# Patient Record
Sex: FEMALE | Race: WHITE | NOT HISPANIC OR LATINO | Employment: FULL TIME | URBAN - METROPOLITAN AREA
[De-identification: names, ages, dates, MRNs, and addresses within clinical notes are randomized per-mention and may not be internally consistent; named-entity substitution may affect disease eponyms.]

---

## 2019-06-17 ENCOUNTER — TELEPHONE (OUTPATIENT)
Dept: PSYCHIATRY | Facility: CLINIC | Age: 34
End: 2019-06-17

## 2019-07-15 ENCOUNTER — TELEPHONE (OUTPATIENT)
Dept: PSYCHIATRY | Facility: CLINIC | Age: 34
End: 2019-07-15

## 2019-07-15 DIAGNOSIS — F90.0 ADHD (ATTENTION DEFICIT HYPERACTIVITY DISORDER), INATTENTIVE TYPE: Primary | ICD-10-CM

## 2019-07-31 ENCOUNTER — OFFICE VISIT (OUTPATIENT)
Dept: PSYCHIATRY | Facility: CLINIC | Age: 34
End: 2019-07-31

## 2019-07-31 DIAGNOSIS — F90.2 ATTENTION DEFICIT HYPERACTIVITY DISORDER (ADHD), COMBINED TYPE: Primary | ICD-10-CM

## 2019-07-31 DIAGNOSIS — F90.0 ADHD (ATTENTION DEFICIT HYPERACTIVITY DISORDER), INATTENTIVE TYPE: ICD-10-CM

## 2019-07-31 PROCEDURE — 99213 OFFICE O/P EST LOW 20 MIN: CPT | Performed by: PSYCHIATRY & NEUROLOGY

## 2019-07-31 NOTE — PROGRESS NOTES
Subjective   Annalisa Fuentes is a 33 y.o. female who presents today for follow up    Chief Complaint:  Depression anxiety decreased concentration     History of Present Illness:   Concentration is better on meds   the pt was doing better on vyvanse chewable, 1/2 BID, she is working long shifts , able to stay productive to finish her work   Her job is pretty unpredictable, no opportunity to make adjustment   denied feeling hopeless/helpless, denied AVH/SI/Hi  anxiety is getting more intense when off meds   concentration - good on meds     Precipitating and Ameliorating Factors: dog passed away         PAST PSYCHIATRIC HISTORY      Previous Psychiatric Diagnoses   Axis I: Attention Deficit Disorder     Past Hospitalizations or Residential Treatment   Locations\Providers: none      Past Outpatient Treatment   Diagnosis Treated: ADD  Location: PCP      Prior Psychiatric Medications   Comments: adderall - skin picking       Consequences of Mental Disorder   Consequences: emotional distress     SOCIAL HISTORY     Number of marriages: 1  Number of children: 0  Current Relationship is: supportive  Family of Origin is: abusive     Current Living Situation   Lives with: spouse     Education   Level: some college     Employment   Job Status: full-time     Hobbies and Leisure Activities   Activity Type: quiet activities     Alcohol use   Freq. drinkin drink  Smoking History   Smoking Hx: Never smoker     Exercise   Exercise sessions (per wk): 1     Illicit Drug Use   Illicit Drugs used: none     FAMILY HISTORY OF MENTAL DISORDERS   fh Grandparents: Non-contributory  fh Mother: Non-contributory  fh Father: Addictive Disorders - Alcoholism  fh Siblings: Non-contributory  fh Other: Non-contributory     The following portions of the patient's history were reviewed and updated as appropriate: allergies, current medications, past family history, past medical history, past social history, past surgical history and problem  list.            Interval History  No Change    Side Effects  Denied       Past Medical History:  Past Medical History:   Diagnosis Date   • ADHD (attention deficit hyperactivity disorder)        Social History:  Social History     Socioeconomic History   • Marital status:      Spouse name: Not on file   • Number of children: Not on file   • Years of education: Not on file   • Highest education level: Not on file   Tobacco Use   • Smoking status: Never Smoker   Substance and Sexual Activity   • Alcohol use: Yes     Comment: socially   • Drug use: No       Family History:  History reviewed. No pertinent family history.    Past Surgical History:  History reviewed. No pertinent surgical history.    Problem List:  Patient Active Problem List   Diagnosis   • Attention deficit hyperactivity disorder (ADHD), combined type       Allergy:   Allergies   Allergen Reactions   • Amoxicillin Nausea And Vomiting        Discontinued Medications:  Medications Discontinued During This Encounter   Medication Reason   • Lisdexamfetamine Dimesylate (VYVANSE) 60 MG chewable tablet Reorder       Current Medications:   Current Outpatient Medications   Medication Sig Dispense Refill   • Lisdexamfetamine Dimesylate (VYVANSE) 60 MG chewable tablet Chew 60 mg Every Morning 30 tablet 0     No current facility-administered medications for this visit.          Review of Symptoms:    Psychiatric/Behavioral: Negative for agitation, behavioral problems, confusion, decreased concentration, dysphoric mood, hallucinations, self-injury, sleep disturbance and suicidal ideas. The patient is not nervous/anxious and is not hyperactive.        Physical Exam:   There were no vitals taken for this visit.    Mental Status Exam:   Hygiene:   good  Cooperation:  Cooperative  Eye Contact:  Good  Psychomotor Behavior:  Appropriate  Affect:  Full range  Mood: anxious  Hopelessness: Denies  Speech:  Normal  Thought Process:  Goal directed and Linear  Thought  Content:  Normal  Suicidal:  None  Homicidal:  None  Hallucinations:  None  Delusion:  None  Memory:  Intact  Orientation:  Person, Place, Time and Situation  Reliability:  good  Insight:  Good  Judgement:  Good  Impulse Control:  Good  Physical/Medical Issues:  No        PHQ-9 Depression Screening  Little interest or pleasure in doing things? 1   Feeling down, depressed, or hopeless? 1   Trouble falling or staying asleep, or sleeping too much? 0   Feeling tired or having little energy? 1   Poor appetite or overeating? 0   Feeling bad about yourself - or that you are a failure or have let yourself or your family down? 1   Trouble concentrating on things, such as reading the newspaper or watching television? 2   Moving or speaking so slowly that other people could have noticed? Or the opposite - being so fidgety or restless that you have been moving around a lot more than usual? 0   Thoughts that you would be better off dead, or of hurting yourself in some way? 0   PHQ-9 Total Score 6   If you checked off any problems, how difficult have these problems made it for you to do your work, take care of things at home, or get along with other people? Very difficult           Never smoker    I advised Annalisa of the risks of tobacco use.     Lab Results:   No visits with results within 3 Month(s) from this visit.   Latest known visit with results is:   No results found for any previous visit.       Assessment/Plan   Problems Addressed this Visit        Other    Attention deficit hyperactivity disorder (ADHD), combined type - Primary    Relevant Medications    Lisdexamfetamine Dimesylate (VYVANSE) 60 MG chewable tablet      Other Visit Diagnoses     ADHD (attention deficit hyperactivity disorder), inattentive type        Relevant Medications    Lisdexamfetamine Dimesylate (VYVANSE) 60 MG chewable tablet          Visit Diagnoses:    ICD-10-CM ICD-9-CM   1. Attention deficit hyperactivity disorder (ADHD), combined type F90.2  314.01   2. ADHD (attention deficit hyperactivity disorder), inattentive type F90.0 314.00       TREATMENT PLAN/GOALS: Continue supportive psychotherapy efforts and medications as indicated. Treatment and medication options discussed during today's visit. Patient ackowledged and verbally consented to continue with current treatment plan and was educated on the importance of compliance with treatment and follow-up appointments.    MEDICATION ISSUES:  Cont vyvanse 60 mg   Issues with work organization discussed   long term use discussed   INSPECT reviewed as expected  Discussed medication options and treatment plan of prescribed medication as well as the risks, benefits, and side effects including potential falls, possible impaired driving and metabolic adversities among others. Patient is agreeable to call the office with any worsening of symptoms or onset of side effects. Patient is agreeable to call 911 or go to the nearest ER should he/she begin having SI/HI. No medication side effects or related complaints today.     MEDS ORDERED DURING VISIT:  New Medications Ordered This Visit   Medications   • Lisdexamfetamine Dimesylate (VYVANSE) 60 MG chewable tablet     Sig: Chew 60 mg Every Morning     Dispense:  30 tablet     Refill:  0       Return in about 3 months (around 10/31/2019).         This document has been electronically signed by Patt Perry MD  July 31, 2019 1:42 PM

## 2019-07-31 NOTE — PATIENT INSTRUCTIONS
Attention Deficit Hyperactivity Disorder, Adult  Attention deficit hyperactivity disorder (ADHD) is a mental health disorder that starts during childhood. For many people with ADHD, the disorder continues into adult years. There are many things that you and your health care provider or therapist (mental health professional) can do to manage your symptoms.  What are the causes?  The exact cause of ADHD is not known.  What increases the risk?  You are more likely to develop this condition if:  · You have a family history of ADHD.  · You are male.  · You were born to a mother who smoked or drank alcohol during pregnancy.  · You were exposed to lead poisoning or other toxins in the womb or in early life.  · You were born before 37 weeks of pregnancy (prematurely) or you had a low birth weight.  · You have experienced a brain injury.  What are the signs or symptoms?  Symptoms of this condition depend on the type of ADHD. The two main types are inattentive and hyperactive-impulsive. Some people may have symptoms of both types.  Symptoms of the inattentive type include:  · Difficulty watching, listening, or thinking with focused effort (paying attention).  · Making careless mistakes.  · Not listening.  · Not following instructions.  · Being disorganized.  · Avoiding tasks that require time and attention.  · Losing things.  · Forgetting things.  · Being easily distracted.  Symptoms of the hyperactive-impulsive type include:  · Restlessness.  · Talking too much.  · Interrupting.  · Difficulty with:  ? Sitting still.  ? Staying quiet.  ? Feeling motivated.  ? Relaxing.  ? Waiting in line or waiting for a turn.  How is this diagnosed?  This condition is diagnosed based on your current symptoms and your history of symptoms. The diagnosis can be made by a provider such as a primary care provider, psychiatrist, psychologist, or clinical . The provider may use a symptom checklist or a standardized behavior rating  scale to evaluate your symptoms. He or she may want to talk with family members who have known you for a long time and have observed your behaviors.  There are no lab tests or brain imaging tests that can diagnose ADHD.  How is this treated?  This condition can be treated with medicines and behavior therapy. Medicines may be the best option to reduce impulsive behaviors and improve attention. Your health care provider may recommend:  · Stimulant medicines. These are the most common medicines used for adult ADHD. They affect certain chemicals in the brain (neurotransmitters). These medicines may be long-acting or short-acting. This will determine how often you need to take the medicine.  · A non-stimulant medicine for adult ADHD (atomoxetine). This medicine increases a neurotransmitter called norepinephrine. It may take weeks to months to see effects from this medicine.  Psychotherapy and behavioral management are also important for treating ADHD. Psychotherapy is often used along with medicine. Your health care provider may suggest:  · Cognitive behavioral therapy (CBT). This type of therapy teaches you to replace negative thoughts and actions with positive thoughts and actions. When used as part of ADHD treatment, this therapy may also include:  ? Coping strategies for organization, time management, impulse control, and stress reduction.  ? Mindfulness and meditation training.  · Behavioral management. This may include strategies for organization and time management. You may work with an ADHD  who is specially trained to help people with ADHD to manage and organize activities and to function more effectively.  Follow these instructions at home:  Medicines    · Take over-the-counter and prescription medicines only as told by your health care provider.  · Talk with your health care provider about the possible side effects of your medicine to watch for.  General instructions    · Learn as much as you can about  adult ADHD, and work closely with your health care providers to find the treatments that work best for you.  · Do not use drugs or abuse alcohol. Limit alcohol intake to no more than 1 drink a day for nonpregnant women and 2 drinks a day for men. One drink equals 12 oz of beer, 5 oz of wine, or 1½ oz of hard liquor.  · Follow the same schedule each day. Make sure your schedule includes enough time for you to get plenty of sleep.  · Use reminder devices like notes, calendars, and phone apps to stay on-time and organized.  · Eat a healthy diet. Do not skip meals.  · Exercise regularly. Exercise can help to reduce stress and anxiety.  · Keep all follow-up visits as told by your health care provider and therapist. This is important.  Where to find more information  · A health care provider may be able to recommend resources that are available online or over the phone. You could start with:  ? Attention Deficit Disorder Association (ADDA): www.add.org  ? [place] (NIMH): www.nimh.nih.gov  Contact a health care provider if:  · Your symptoms are changing, getting worse, or not improving.  · You have side effects from your medicine, such as:  ? Repeated muscle twitches, coughing, or speech outbursts.  ? Sleep problems.  ? Loss of appetite.  ? Depression.  ? New or worsening behavior problems.  ? Dizziness.  ? Unusually fast heartbeat.  ? Stomach pains.  ? Headaches.  · You are struggling with anxiety, depression, or substance abuse.  Get help right away if:  · You have a severe reaction to a medicine.  · You have thoughts of hurting yourself or others.  If you ever feel like you may hurt yourself or others, or have thoughts about taking your own life, get help right away. You can go to the nearest emergency department or call:  · Your local emergency services (911 in the [place]  · A suicide crisis helpline, such as the National Suicide Prevention Lifeline at 1-130.688.3394. This is open 24 hours a  day.  Summary  · ADHD is a mental health disorder that starts during childhood and often continues into adult years.  · The exact cause of ADHD is not known.  · There is no cure for ADHD, but treatment with medicine, therapy, or behavioral training can help you manage your condition.  This information is not intended to replace advice given to you by your health care provider. Make sure you discuss any questions you have with your health care provider.  Document Released: 08/09/2018 Document Revised: 08/09/2018 Document Reviewed: 08/09/2018  [employer] Interactive Patient Education © 2019 [employer]

## 2019-08-08 DIAGNOSIS — F90.0 ADHD (ATTENTION DEFICIT HYPERACTIVITY DISORDER), INATTENTIVE TYPE: ICD-10-CM

## 2019-09-09 DIAGNOSIS — F90.0 ADHD (ATTENTION DEFICIT HYPERACTIVITY DISORDER), INATTENTIVE TYPE: ICD-10-CM

## 2019-10-09 DIAGNOSIS — F90.0 ADHD (ATTENTION DEFICIT HYPERACTIVITY DISORDER), INATTENTIVE TYPE: ICD-10-CM

## 2019-11-06 ENCOUNTER — OFFICE VISIT (OUTPATIENT)
Dept: PSYCHIATRY | Facility: CLINIC | Age: 34
End: 2019-11-06

## 2019-11-06 ENCOUNTER — DOCUMENTATION (OUTPATIENT)
Dept: PSYCHIATRY | Facility: CLINIC | Age: 34
End: 2019-11-06

## 2019-11-06 DIAGNOSIS — F90.0 ATTENTION DEFICIT HYPERACTIVITY DISORDER (ADHD), PREDOMINANTLY INATTENTIVE TYPE: Primary | ICD-10-CM

## 2019-11-06 DIAGNOSIS — Z79.899 ENCOUNTER FOR LONG-TERM (CURRENT) USE OF OTHER MEDICATIONS: ICD-10-CM

## 2019-11-06 DIAGNOSIS — F90.0 ADHD (ATTENTION DEFICIT HYPERACTIVITY DISORDER), INATTENTIVE TYPE: ICD-10-CM

## 2019-11-06 PROBLEM — F90.2 ATTENTION DEFICIT HYPERACTIVITY DISORDER (ADHD), COMBINED TYPE: Status: RESOLVED | Noted: 2018-12-19 | Resolved: 2019-11-06

## 2019-11-06 PROCEDURE — 99213 OFFICE O/P EST LOW 20 MIN: CPT | Performed by: PSYCHIATRY & NEUROLOGY

## 2019-11-06 NOTE — PATIENT INSTRUCTIONS
Attention Deficit Hyperactivity Disorder, Adult  Attention deficit hyperactivity disorder (ADHD) is a mental health disorder that starts during childhood. For many people with ADHD, the disorder continues into adult years. There are many things that you and your health care provider or therapist (mental health professional) can do to manage your symptoms.  What are the causes?  The exact cause of ADHD is not known.  What increases the risk?  You are more likely to develop this condition if:  · You have a family history of ADHD.  · You are male.  · You were born to a mother who smoked or drank alcohol during pregnancy.  · You were exposed to lead poisoning or other toxins in the womb or in early life.  · You were born before 37 weeks of pregnancy (prematurely) or you had a low birth weight.  · You have experienced a brain injury.  What are the signs or symptoms?  Symptoms of this condition depend on the type of ADHD. The two main types are inattentive and hyperactive-impulsive. Some people may have symptoms of both types.  Symptoms of the inattentive type include:  · Difficulty watching, listening, or thinking with focused effort (paying attention).  · Making careless mistakes.  · Not listening.  · Not following instructions.  · Being disorganized.  · Avoiding tasks that require time and attention.  · Losing things.  · Forgetting things.  · Being easily distracted.  Symptoms of the hyperactive-impulsive type include:  · Restlessness.  · Talking too much.  · Interrupting.  · Difficulty with:  ? Sitting still.  ? Staying quiet.  ? Feeling motivated.  ? Relaxing.  ? Waiting in line or waiting for a turn.  How is this diagnosed?  This condition is diagnosed based on your current symptoms and your history of symptoms. The diagnosis can be made by a provider such as a primary care provider, psychiatrist, psychologist, or clinical . The provider may use a symptom checklist or a standardized behavior rating  scale to evaluate your symptoms. He or she may want to talk with family members who have known you for a long time and have observed your behaviors.  There are no lab tests or brain imaging tests that can diagnose ADHD.  How is this treated?  This condition can be treated with medicines and behavior therapy. Medicines may be the best option to reduce impulsive behaviors and improve attention. Your health care provider may recommend:  · Stimulant medicines. These are the most common medicines used for adult ADHD. They affect certain chemicals in the brain (neurotransmitters). These medicines may be long-acting or short-acting. This will determine how often you need to take the medicine.  · A non-stimulant medicine for adult ADHD (atomoxetine). This medicine increases a neurotransmitter called norepinephrine. It may take weeks to months to see effects from this medicine.  Psychotherapy and behavioral management are also important for treating ADHD. Psychotherapy is often used along with medicine. Your health care provider may suggest:  · Cognitive behavioral therapy (CBT). This type of therapy teaches you to replace negative thoughts and actions with positive thoughts and actions. When used as part of ADHD treatment, this therapy may also include:  ? Coping strategies for organization, time management, impulse control, and stress reduction.  ? Mindfulness and meditation training.  · Behavioral management. This may include strategies for organization and time management. You may work with an ADHD  who is specially trained to help people with ADHD to manage and organize activities and to function more effectively.  Follow these instructions at home:  Medicines    · Take over-the-counter and prescription medicines only as told by your health care provider.  · Talk with your health care provider about the possible side effects of your medicine to watch for.  General instructions    · Learn as much as you can about  adult ADHD, and work closely with your health care providers to find the treatments that work best for you.  · Do not use drugs or abuse alcohol. Limit alcohol intake to no more than 1 drink a day for nonpregnant women and 2 drinks a day for men. One drink equals 12 oz of beer, 5 oz of wine, or 1½ oz of hard liquor.  · Follow the same schedule each day. Make sure your schedule includes enough time for you to get plenty of sleep.  · Use reminder devices like notes, calendars, and phone apps to stay on-time and organized.  · Eat a healthy diet. Do not skip meals.  · Exercise regularly. Exercise can help to reduce stress and anxiety.  · Keep all follow-up visits as told by your health care provider and therapist. This is important.  Where to find more information  · A health care provider may be able to recommend resources that are available online or over the phone. You could start with:  ? Attention Deficit Disorder Association (ADDA): www.add.org  ? National [place] of Mental Health (NIMH): www.nimh.nih.gov  Contact a health care provider if:  · Your symptoms are changing, getting worse, or not improving.  · You have side effects from your medicine, such as:  ? Repeated muscle twitches, coughing, or speech outbursts.  ? Sleep problems.  ? Loss of appetite.  ? Depression.  ? New or worsening behavior problems.  ? Dizziness.  ? Unusually fast heartbeat.  ? Stomach pains.  ? Headaches.  · You are struggling with anxiety, depression, or substance abuse.  Get help right away if:  · You have a severe reaction to a medicine.  · You have thoughts of hurting yourself or others.  If you ever feel like you may hurt yourself or others, or have thoughts about taking your own life, get help right away. You can go to the nearest emergency department or call:  · Your local emergency services (911 in the [place]  · A suicide crisis helpline, such as the National Suicide Prevention Lifeline at 1-301.823.1305. This is open 24 hours a  day.  Summary  · ADHD is a mental health disorder that starts during childhood and often continues into adult years.  · The exact cause of ADHD is not known.  · There is no cure for ADHD, but treatment with medicine, therapy, or behavioral training can help you manage your condition.  This information is not intended to replace advice given to you by your health care provider. Make sure you discuss any questions you have with your health care provider.  Document Released: 08/09/2018 Document Revised: 08/09/2018 Document Reviewed: 08/09/2018  [employer] Interactive Patient Education © 2019 [employer]

## 2019-11-06 NOTE — PROGRESS NOTES
Subjective   Annalisa Fuentes is a 33 y.o. female who presents today for follow up    Chief Complaint:  Depression anxiety decreased concentration     History of Present Illness:   Long history of ADHD, the pt is very busy at work  Toward the end of the year.   Concentration is better on meds , last long enough   the pt was doing better on vyvanse chewable, 1/2 BID, she is working long shifts , able to stay productive to finish her work, not missing deadlines    Her job is pretty unpredictable, no opportunity to make adjustment based on her schedule  The pt denied feeling depressed/hopeless/helpless,    Based on the nature of her job she has ot be accurate   anxiety is getting more intense when off meds   concentration - good on meds     Precipitating and Ameliorating Factors: dog passed away         PAST PSYCHIATRIC HISTORY      Previous Psychiatric Diagnoses   Axis I: Attention Deficit Disorder     Past Hospitalizations or Residential Treatment   Locations\Providers: none      Past Outpatient Treatment   Diagnosis Treated: ADD  Location: PCP      Prior Psychiatric Medications   Comments: adderall - skin picking       Consequences of Mental Disorder   Consequences: emotional distress     SOCIAL HISTORY     Number of marriages: 1  Number of children: 0  Current Relationship is: supportive  Family of Origin is: abusive     Current Living Situation   Lives with: spouse     Education   Level: some college     Employment   Job Status: full-time     Hobbies and Leisure Activities   Activity Type: quiet activities     Alcohol use   Freq. drinkin drink  Smoking History   Smoking Hx: Never smoker     Exercise   Exercise sessions (per wk): 1     Illicit Drug Use   Illicit Drugs used: none     FAMILY HISTORY OF MENTAL DISORDERS   fh Grandparents: Non-contributory  fh Mother: Non-contributory  fh Father: Addictive Disorders - Alcoholism  fh Siblings: Non-contributory  fh Other: Non-contributory     The following  portions of the patient's history were reviewed and updated as appropriate: allergies, current medications, past family history, past medical history, past social history, past surgical history and problem list.            Interval History  No Change    Side Effects  Denied       Past Medical History:  Past Medical History:   Diagnosis Date   • ADHD (attention deficit hyperactivity disorder)        Social History:  Social History     Socioeconomic History   • Marital status:      Spouse name: Not on file   • Number of children: Not on file   • Years of education: Not on file   • Highest education level: Not on file   Tobacco Use   • Smoking status: Never Smoker   • Smokeless tobacco: Never Used   Substance and Sexual Activity   • Alcohol use: Yes     Comment: socially   • Drug use: No   • Sexual activity: Defer       Family History:  History reviewed. No pertinent family history.    Past Surgical History:  History reviewed. No pertinent surgical history.    Problem List:  Patient Active Problem List   Diagnosis   • ADD (attention deficit disorder)   • Encounter for long-term (current) use of other medications       Allergy:   Allergies   Allergen Reactions   • Amoxicillin Nausea And Vomiting        Discontinued Medications:  Medications Discontinued During This Encounter   Medication Reason   • Lisdexamfetamine Dimesylate (VYVANSE) 60 MG chewable tablet Reorder       Current Medications:   Current Outpatient Medications   Medication Sig Dispense Refill   • Lisdexamfetamine Dimesylate (VYVANSE) 60 MG chewable tablet Chew 60 mg Every Morning 30 tablet 0     No current facility-administered medications for this visit.          Review of Symptoms:    Psychiatric/Behavioral: Negative for agitation, behavioral problems, confusion, decreased concentration, dysphoric mood, hallucinations, self-injury, sleep disturbance and suicidal ideas.   The patient is not nervous/anxious and is not hyperactive.        Physical  Exam:   There were no vitals taken for this visit.    Mental Status Exam:   Hygiene:   good  Cooperation:  Cooperative  Eye Contact:  Good  Psychomotor Behavior:  Appropriate  Affect:  Full range  Mood: fluctates  Hopelessness: Denies  Speech:  Normal  Thought Process:  Goal directed and Linear  Thought Content:  Normal  Suicidal:  None  Homicidal:  None  Hallucinations:  None  Delusion:  None  Memory:  Intact  Orientation:  Person, Place, Time and Situation  Reliability:  good  Insight:  Good  Judgement:  Good  Impulse Control:  Good  Physical/Medical Issues:  No      MSE was reviewed and accepted     PHQ-9 Depression Screening  Little interest or pleasure in doing things? 1   Feeling down, depressed, or hopeless? 1   Trouble falling or staying asleep, or sleeping too much? 0   Feeling tired or having little energy? 1   Poor appetite or overeating? 0   Feeling bad about yourself - or that you are a failure or have let yourself or your family down? 1   Trouble concentrating on things, such as reading the newspaper or watching television? 2   Moving or speaking so slowly that other people could have noticed? Or the opposite - being so fidgety or restless that you have been moving around a lot more than usual? 0   Thoughts that you would be better off dead, or of hurting yourself in some way? 0   PHQ-9 Total Score 6   If you checked off any problems, how difficult have these problems made it for you to do your work, take care of things at home, or get along with other people? Very difficult           Never smoker    I advised Annalisa of the risks of tobacco use.     Lab Results:   No visits with results within 3 Month(s) from this visit.   Latest known visit with results is:   No results found for any previous visit.       Assessment/Plan   Problems Addressed this Visit        Other    ADD (attention deficit disorder) - Primary    Relevant Medications    Lisdexamfetamine Dimesylate (VYVANSE) 60 MG chewable tablet     Encounter for long-term (current) use of other medications      Other Visit Diagnoses     ADHD (attention deficit hyperactivity disorder), inattentive type        Relevant Medications    Lisdexamfetamine Dimesylate (VYVANSE) 60 MG chewable tablet          Visit Diagnoses:    ICD-10-CM ICD-9-CM   1. Attention deficit hyperactivity disorder (ADHD), predominantly inattentive type F90.0 314.00   2. ADHD (attention deficit hyperactivity disorder), inattentive type F90.0 314.00   3. Encounter for long-term (current) use of other medications Z79.899 V58.69       TREATMENT PLAN/GOALS: Continue supportive psychotherapy efforts and medications as indicated. Treatment and medication options discussed during today's visit. Patient ackowledged and verbally consented to continue with current treatment plan and was educated on the importance of compliance with treatment and follow-up appointments.    MEDICATION ISSUES:  Cont vyvanse 60 mg   Issues with work organization discussed  UDS today    long term use discussed   INSPECT reviewed as expected - 10/9/19 - last refill   Discussed medication options and treatment plan of prescribed medication as well as the risks, benefits, and side effects including potential falls, possible impaired driving and metabolic adversities among others. Patient is agreeable to call the office with any worsening of symptoms or onset of side effects. Patient is agreeable to call 911 or go to the nearest ER should he/she begin having SI/HI. No medication side effects or related complaints today.     MEDS ORDERED DURING VISIT:  New Medications Ordered This Visit   Medications   • Lisdexamfetamine Dimesylate (VYVANSE) 60 MG chewable tablet     Sig: Chew 60 mg Every Morning     Dispense:  30 tablet     Refill:  0       Return in about 3 months (around 2/6/2020).         This document has been electronically signed by Patt Perry MD  November 6, 2019 2:04 PM

## 2019-12-03 DIAGNOSIS — F90.0 ADHD (ATTENTION DEFICIT HYPERACTIVITY DISORDER), INATTENTIVE TYPE: ICD-10-CM

## 2020-01-02 DIAGNOSIS — F90.0 ADHD (ATTENTION DEFICIT HYPERACTIVITY DISORDER), INATTENTIVE TYPE: ICD-10-CM

## 2020-01-30 ENCOUNTER — TELEPHONE (OUTPATIENT)
Dept: PSYCHIATRY | Facility: CLINIC | Age: 35
End: 2020-01-30

## 2020-01-30 DIAGNOSIS — F90.0 ADHD (ATTENTION DEFICIT HYPERACTIVITY DISORDER), INATTENTIVE TYPE: ICD-10-CM

## 2020-02-13 ENCOUNTER — OFFICE VISIT (OUTPATIENT)
Dept: PSYCHIATRY | Facility: CLINIC | Age: 35
End: 2020-02-13

## 2020-02-13 DIAGNOSIS — Z79.899 ENCOUNTER FOR LONG-TERM (CURRENT) USE OF OTHER MEDICATIONS: ICD-10-CM

## 2020-02-13 DIAGNOSIS — F90.0 ATTENTION DEFICIT HYPERACTIVITY DISORDER (ADHD), PREDOMINANTLY INATTENTIVE TYPE: Primary | ICD-10-CM

## 2020-02-13 DIAGNOSIS — F90.0 ADHD (ATTENTION DEFICIT HYPERACTIVITY DISORDER), INATTENTIVE TYPE: ICD-10-CM

## 2020-02-13 PROCEDURE — 99213 OFFICE O/P EST LOW 20 MIN: CPT | Performed by: PSYCHIATRY & NEUROLOGY

## 2020-02-13 NOTE — PROGRESS NOTES
Subjective   Annalisa Fuentes is a 34 y.o. female who presents today for follow up    Chief Complaint:  Depression anxiety decreased concentration     History of Present Illness:   Long history of ADHD, the pt is very busy at work    Concentration is better on meds , vyvanse chewable last long enough    1/2 BID, she is working long shifts , able to stay productive to finish her work, not missing deadlines  . However, she noticed it is not as effective as higher dose she was on , but higher dose was giving side effects   Her job is pretty unpredictable, no opportunity to make adjustment based on her schedule  The pt denied feeling depressed/hopeless/helpless,    Denied AVH/SI/HI     concentration - good on meds     Precipitating and Ameliorating Factors: job related stress          PAST PSYCHIATRIC HISTORY      Previous Psychiatric Diagnoses   Axis I: Attention Deficit Disorder     Past Hospitalizations or Residential Treatment   Locations\Providers: none      Past Outpatient Treatment   Diagnosis Treated: ADD  Location: PCP      Prior Psychiatric Medications   Comments: adderall - skin picking       Consequences of Mental Disorder   Consequences: emotional distress     SOCIAL HISTORY     Number of marriages: 1  Number of children: 0  Current Relationship is: supportive  Family of Origin is: abusive     Current Living Situation   Lives with: spouse     Education   Level: some college     Employment   Job Status: full-time     Hobbies and Leisure Activities   Activity Type: quiet activities     Alcohol use   Freq. drinkin drink  Smoking History   Smoking Hx: Never smoker     Exercise   Exercise sessions (per wk): 1     Illicit Drug Use   Illicit Drugs used: none     FAMILY HISTORY OF MENTAL DISORDERS   fh Grandparents: Non-contributory  fh Mother: Non-contributory  fh Father: Addictive Disorders - Alcoholism  fh Siblings: Non-contributory  fh Other: Non-contributory     The following portions of the patient's  history were reviewed and updated as appropriate: allergies, current medications, past family history, past medical history, past social history, past surgical history and problem list.            Interval History  No Change    Side Effects  Denied       Past Medical History:  Past Medical History:   Diagnosis Date   • ADHD (attention deficit hyperactivity disorder)        Social History:  Social History     Socioeconomic History   • Marital status:      Spouse name: Not on file   • Number of children: Not on file   • Years of education: Not on file   • Highest education level: Not on file   Tobacco Use   • Smoking status: Never Smoker   • Smokeless tobacco: Never Used   Substance and Sexual Activity   • Alcohol use: Yes     Comment: socially   • Drug use: No   • Sexual activity: Defer       Family History:  History reviewed. No pertinent family history.    Past Surgical History:  History reviewed. No pertinent surgical history.    Problem List:  Patient Active Problem List   Diagnosis   • ADD (attention deficit disorder)   • Encounter for long-term (current) use of other medications       Allergy:   Allergies   Allergen Reactions   • Amoxicillin Nausea And Vomiting        Discontinued Medications:  Medications Discontinued During This Encounter   Medication Reason   • Lisdexamfetamine Dimesylate (VYVANSE) 60 MG chewable tablet Reorder       Current Medications:   Current Outpatient Medications   Medication Sig Dispense Refill   • Lisdexamfetamine Dimesylate (VYVANSE) 60 MG chewable tablet Chew 60 mg Every Morning 30 tablet 0     No current facility-administered medications for this visit.          Review of Symptoms:    Psychiatric/Behavioral: Negative for agitation, behavioral problems, confusion, decreased concentration, dysphoric mood, hallucinations, self-injury, sleep disturbance and suicidal ideas.   The patient is not nervous/anxious and is not hyperactive.        Physical Exam:   There were no vitals  taken for this visit.    Mental Status Exam:   Hygiene:   good  Cooperation:  Cooperative  Eye Contact:  Good  Psychomotor Behavior:  Appropriate  Affect:  Appropriate  Mood: euthymic  Hopelessness: Denies  Speech:  Normal  Thought Process:  Goal directed and Linear  Thought Content:  Normal  Suicidal:  None  Homicidal:  None  Hallucinations:  None  Delusion:  None  Memory:  Intact  Orientation:  Person, Place, Time and Situation  Reliability:  good  Insight:  Good  Judgement:  Good  Impulse Control:  Good  Physical/Medical Issues:  No      MSE from 11/6/19  was reviewed and accepted     PHQ-9 Depression Screening  Little interest or pleasure in doing things? 1   Feeling down, depressed, or hopeless? 1   Trouble falling or staying asleep, or sleeping too much? 1   Feeling tired or having little energy? 1   Poor appetite or overeating? 0   Feeling bad about yourself - or that you are a failure or have let yourself or your family down? 1   Trouble concentrating on things, such as reading the newspaper or watching television? 3   Moving or speaking so slowly that other people could have noticed? Or the opposite - being so fidgety or restless that you have been moving around a lot more than usual? 0   Thoughts that you would be better off dead, or of hurting yourself in some way? 0   PHQ-9 Total Score 8   If you checked off any problems, how difficult have these problems made it for you to do your work, take care of things at home, or get along with other people? Very difficult           Never smoker    I advised Annalisa of the risks of tobacco use.     Lab Results:   No visits with results within 3 Month(s) from this visit.   Latest known visit with results is:   No results found for any previous visit.       Assessment/Plan   Problems Addressed this Visit        Other    ADD (attention deficit disorder) - Primary    Relevant Medications    Lisdexamfetamine Dimesylate (VYVANSE) 60 MG chewable tablet    Encounter for  long-term (current) use of other medications      Other Visit Diagnoses     ADHD (attention deficit hyperactivity disorder), inattentive type        Relevant Medications    Lisdexamfetamine Dimesylate (VYVANSE) 60 MG chewable tablet          Visit Diagnoses:    ICD-10-CM ICD-9-CM   1. Attention deficit hyperactivity disorder (ADHD), predominantly inattentive type F90.0 314.00   2. Encounter for long-term (current) use of other medications Z79.899 V58.69   3. ADHD (attention deficit hyperactivity disorder), inattentive type F90.0 314.00       TREATMENT PLAN/GOALS: Continue supportive psychotherapy efforts and medications as indicated. Treatment and medication options discussed during today's visit. Patient ackowledged and verbally consented to continue with current treatment plan and was educated on the importance of compliance with treatment and follow-up appointments.    MEDICATION ISSUES:  Cont vyvanse chewable  60 mg , consider Adhansia   Issues with work organization discussed  UDS consistent     long term use discussed   INSPECT reviewed as expected - 2/3/2020 - last refill   Discussed medication options and treatment plan of prescribed medication as well as the risks, benefits, and side effects including potential falls, possible impaired driving and metabolic adversities among others. Patient is agreeable to call the office with any worsening of symptoms or onset of side effects. Patient is agreeable to call 911 or go to the nearest ER should he/she begin having SI/HI. No medication side effects or related complaints today.     MEDS ORDERED DURING VISIT:  New Medications Ordered This Visit   Medications   • Lisdexamfetamine Dimesylate (VYVANSE) 60 MG chewable tablet     Sig: Chew 60 mg Every Morning     Dispense:  30 tablet     Refill:  0     Please dispense on or after March  3, 2020       Return in about 3 months (around 5/13/2020).         This document has been electronically signed by Patt Perry  MD  February 13, 2020 2:08 PM

## 2020-02-13 NOTE — PATIENT INSTRUCTIONS
Attention Deficit Hyperactivity Disorder, Adult  Attention deficit hyperactivity disorder (ADHD) is a mental health disorder that starts during childhood. For many people with ADHD, the disorder continues into adult years. There are many things that you and your health care provider or therapist (mental health professional) can do to manage your symptoms.  What are the causes?  The exact cause of ADHD is not known.  What increases the risk?  You are more likely to develop this condition if:  · You have a family history of ADHD.  · You are male.  · You were born to a mother who smoked or drank alcohol during pregnancy.  · You were exposed to lead poisoning or other toxins in the womb or in early life.  · You were born before 37 weeks of pregnancy (prematurely) or you had a low birth weight.  · You have experienced a brain injury.  What are the signs or symptoms?  Symptoms of this condition depend on the type of ADHD. The two main types are inattentive and hyperactive-impulsive. Some people may have symptoms of both types.  Symptoms of the inattentive type include:  · Difficulty watching, listening, or thinking with focused effort (paying attention).  · Making careless mistakes.  · Not listening.  · Not following instructions.  · Being disorganized.  · Avoiding tasks that require time and attention.  · Losing things.  · Forgetting things.  · Being easily distracted.  Symptoms of the hyperactive-impulsive type include:  · Restlessness.  · Talking too much.  · Interrupting.  · Difficulty with:  ? Sitting still.  ? Staying quiet.  ? Feeling motivated.  ? Relaxing.  ? Waiting in line or waiting for a turn.  How is this diagnosed?  This condition is diagnosed based on your current symptoms and your history of symptoms. The diagnosis can be made by a provider such as a primary care provider, psychiatrist, psychologist, or clinical . The provider may use a symptom checklist or a standardized behavior rating  scale to evaluate your symptoms. He or she may want to talk with family members who have known you for a long time and have observed your behaviors.  There are no lab tests or brain imaging tests that can diagnose ADHD.  How is this treated?  This condition can be treated with medicines and behavior therapy. Medicines may be the best option to reduce impulsive behaviors and improve attention. Your health care provider may recommend:  · Stimulant medicines. These are the most common medicines used for adult ADHD. They affect certain chemicals in the brain (neurotransmitters). These medicines may be long-acting or short-acting. This will determine how often you need to take the medicine.  · A non-stimulant medicine for adult ADHD (atomoxetine). This medicine increases a neurotransmitter called norepinephrine. It may take weeks to months to see effects from this medicine.  Psychotherapy and behavioral management are also important for treating ADHD. Psychotherapy is often used along with medicine. Your health care provider may suggest:  · Cognitive behavioral therapy (CBT). This type of therapy teaches you to replace negative thoughts and actions with positive thoughts and actions. When used as part of ADHD treatment, this therapy may also include:  ? Coping strategies for organization, time management, impulse control, and stress reduction.  ? Mindfulness and meditation training.  · Behavioral management. This may include strategies for organization and time management. You may work with an ADHD  who is specially trained to help people with ADHD to manage and organize activities and to function more effectively.  Follow these instructions at home:  Medicines    · Take over-the-counter and prescription medicines only as told by your health care provider.  · Talk with your health care provider about the possible side effects of your medicine to watch for.  General instructions    · Learn as much as you can about  adult ADHD, and work closely with your health care providers to find the treatments that work best for you.  · Do not use drugs or abuse alcohol. Limit alcohol intake to no more than 1 drink a day for nonpregnant women and 2 drinks a day for men. One drink equals 12 oz of beer, 5 oz of wine, or 1½ oz of hard liquor.  · Follow the same schedule each day. Make sure your schedule includes enough time for you to get plenty of sleep.  · Use reminder devices like notes, calendars, and phone apps to stay on-time and organized.  · Eat a healthy diet. Do not skip meals.  · Exercise regularly. Exercise can help to reduce stress and anxiety.  · Keep all follow-up visits as told by your health care provider and therapist. This is important.  Where to find more information  · A health care provider may be able to recommend resources that are available online or over the phone. You could start with:  ? Attention Deficit Disorder Association (ADDA): www.add.org  ? [place] of [employer] Health (NIMH): www.nimh.nih.gov  Contact a health care provider if:  · Your symptoms are changing, getting worse, or not improving.  · You have side effects from your medicine, such as:  ? Repeated muscle twitches, coughing, or speech outbursts.  ? Sleep problems.  ? Loss of appetite.  ? Depression.  ? New or worsening behavior problems.  ? Dizziness.  ? Unusually fast heartbeat.  ? Stomach pains.  ? Headaches.  · You are struggling with anxiety, depression, or substance abuse.  Get help right away if:  · You have a severe reaction to a medicine.  · You have thoughts of hurting yourself or others.  If you ever feel like you may hurt yourself or others, or have thoughts about taking your own life, get help right away. You can go to the nearest emergency department or call:  · Your local emergency services (911 in the [place]  · A suicide crisis helpline, such as the National Suicide Prevention Lifeline at 1-525.404.1044. This is open 24 hours a  day.  Summary  · ADHD is a mental health disorder that starts during childhood and often continues into adult years.  · The exact cause of ADHD is not known.  · There is no cure for ADHD, but treatment with medicine, therapy, or behavioral training can help you manage your condition.  This information is not intended to replace advice given to you by your health care provider. Make sure you discuss any questions you have with your health care provider.  Document Released: 08/09/2018 Document Revised: 08/09/2018 Document Reviewed: 08/09/2018  [employer] Patient Education © 2019 [employer]

## 2020-03-20 DIAGNOSIS — F90.0 ADHD (ATTENTION DEFICIT HYPERACTIVITY DISORDER), INATTENTIVE TYPE: ICD-10-CM

## 2020-03-23 ENCOUNTER — TELEPHONE (OUTPATIENT)
Dept: PSYCHIATRY | Facility: CLINIC | Age: 35
End: 2020-03-23

## 2020-04-27 DIAGNOSIS — F90.0 ADHD (ATTENTION DEFICIT HYPERACTIVITY DISORDER), INATTENTIVE TYPE: ICD-10-CM

## 2020-05-19 ENCOUNTER — TELEMEDICINE (OUTPATIENT)
Dept: PSYCHIATRY | Facility: CLINIC | Age: 35
End: 2020-05-19

## 2020-05-19 DIAGNOSIS — Z79.899 ENCOUNTER FOR LONG-TERM (CURRENT) USE OF OTHER MEDICATIONS: ICD-10-CM

## 2020-05-19 DIAGNOSIS — F90.0 ADHD (ATTENTION DEFICIT HYPERACTIVITY DISORDER), INATTENTIVE TYPE: Primary | ICD-10-CM

## 2020-05-19 PROCEDURE — 99212 OFFICE O/P EST SF 10 MIN: CPT | Performed by: PSYCHIATRY & NEUROLOGY

## 2020-05-19 NOTE — PROGRESS NOTES
Subjective   Annalisa Fuentes is a 34 y.o. female who presents today for follow up via video chart     Chief Complaint:  Depression anxiety decreased concentration     History of Present Illness:   Long history of ADHD,  Was  on different meds in the past, was stable on current meds   the pt is very busy at work  But she is working from home now, feels better  And less distractions ,    Concentration is better on meds , vyvanse chewable last long enough , able to finish her tasks , with meds she is  able to stay productive to finish her work, not missing deadlines  .   The pt denied feeling depressed/hopeless/helpless,    Denied AVH/SI/HI     concentration - good on meds  Sleep is good      Precipitating and Ameliorating Factors: job related stress          PAST PSYCHIATRIC HISTORY      Previous Psychiatric Diagnoses   Axis I: Attention Deficit Disorder     Past Hospitalizations or Residential Treatment   Locations\Providers: none      Past Outpatient Treatment   Diagnosis Treated: ADD  Location: PCP      Prior Psychiatric Medications   Comments: adderall - skin picking       Consequences of Mental Disorder   Consequences: emotional distress     SOCIAL HISTORY     Number of marriages: 1  Number of children: 0  Current Relationship is: supportive  Family of Origin is: abusive     Current Living Situation   Lives with: spouse     Education   Level: some college     Employment   Job Status: full-time     Hobbies and Leisure Activities   Activity Type: quiet activities     Alcohol use   Freq. drinkin drink  Smoking History   Smoking Hx: Never smoker     Exercise   Exercise sessions (per wk): 1     Illicit Drug Use   Illicit Drugs used: none     FAMILY HISTORY OF MENTAL DISORDERS   fh Grandparents: Non-contributory  fh Mother: Non-contributory  fh Father: Addictive Disorders - Alcoholism  fh Siblings: Non-contributory  fh Other: Non-contributory     The following portions of the patient's history were reviewed and  updated as appropriate: allergies, current medications, past family history, past medical history, past social history, past surgical history and problem list.            Interval History  No Change - stable     Side Effects  Denied       Past Medical History:  Past Medical History:   Diagnosis Date   • ADHD (attention deficit hyperactivity disorder)        Social History:  Social History     Socioeconomic History   • Marital status:      Spouse name: Not on file   • Number of children: Not on file   • Years of education: Not on file   • Highest education level: Not on file   Tobacco Use   • Smoking status: Never Smoker   • Smokeless tobacco: Never Used   Substance and Sexual Activity   • Alcohol use: Yes     Comment: socially   • Drug use: No   • Sexual activity: Defer       Family History:  History reviewed. No pertinent family history.    Past Surgical History:  History reviewed. No pertinent surgical history.    Problem List:  Patient Active Problem List   Diagnosis   • ADD (attention deficit disorder)   • Encounter for long-term (current) use of other medications       Allergy:   Allergies   Allergen Reactions   • Amoxicillin Nausea And Vomiting        Discontinued Medications:  Medications Discontinued During This Encounter   Medication Reason   • Lisdexamfetamine Dimesylate (Vyvanse) 60 MG chewable tablet Reorder       Current Medications:   Current Outpatient Medications   Medication Sig Dispense Refill   • Lisdexamfetamine Dimesylate (Vyvanse) 60 MG chewable tablet Chew 1 tablet Every Morning 30 tablet 0     No current facility-administered medications for this visit.          Review of Symptoms:    Psychiatric/Behavioral: Negative for agitation, behavioral problems, confusion, decreased concentration, dysphoric mood, hallucinations, self-injury, sleep disturbance and suicidal ideas.   The patient is not nervous/anxious and is not hyperactive.        Physical Exam:   There were no vitals taken for  this visit.    Mental Status Exam:   Hygiene:   good  Cooperation:  Cooperative  Eye Contact:  Good  Psychomotor Behavior:  Appropriate  Affect:  Appropriate  Mood: normal  Hopelessness: Denies  Speech:  Normal  Thought Process:  Goal directed and Linear  Thought Content:  Normal  Suicidal:  None  Homicidal:  None  Hallucinations:  None  Delusion:  None  Memory:  Intact  Orientation:  Person, Place, Time and Situation  Reliability:  good  Insight:  Good  Judgement:  Good  Impulse Control:  Good  Physical/Medical Issues:  No      MSE from 2/13/2020   was reviewed and accepted     PHQ-9 Depression Screening  Little interest or pleasure in doing things? 1   Feeling down, depressed, or hopeless? 1   Trouble falling or staying asleep, or sleeping too much? 0   Feeling tired or having little energy? 0   Poor appetite or overeating? 0   Feeling bad about yourself - or that you are a failure or have let yourself or your family down? 0   Trouble concentrating on things, such as reading the newspaper or watching television? 2   Moving or speaking so slowly that other people could have noticed? Or the opposite - being so fidgety or restless that you have been moving around a lot more than usual? 0   Thoughts that you would be better off dead, or of hurting yourself in some way? 0   PHQ-9 Total Score 4   If you checked off any problems, how difficult have these problems made it for you to do your work, take care of things at home, or get along with other people? Somewhat difficult           Never smoker    I advised Annalisa of the risks of tobacco use.     Lab Results:   No visits with results within 3 Month(s) from this visit.   Latest known visit with results is:   No results found for any previous visit.       Assessment/Plan   Problems Addressed this Visit        Other    Encounter for long-term (current) use of other medications      Other Visit Diagnoses     ADHD (attention deficit hyperactivity disorder), inattentive type    " -  Primary    Relevant Medications    Lisdexamfetamine Dimesylate (Vyvanse) 60 MG chewable tablet          Visit Diagnoses:    ICD-10-CM ICD-9-CM   1. ADHD (attention deficit hyperactivity disorder), inattentive type F90.0 314.00   2. Encounter for long-term (current) use of other medications Z79.899 V58.69       TREATMENT PLAN/GOALS: Continue supportive psychotherapy efforts and medications as indicated. Treatment and medication options discussed during today's visit. Patient ackowledged and verbally consented to continue with current treatment plan and was educated on the importance of compliance with treatment and follow-up appointments.    MEDICATION ISSUES:  Cont vyvanse chewable  60 mg , UDS consistent     long term stimulant use discussed , to take \"drug holidays\" suggested   INSPECT reviewed as expected - 4/30/2020 - last refill   Discussed medication options and treatment plan of prescribed medication as well as the risks, benefits, and side effects including potential falls, possible impaired driving and metabolic adversities among others. Patient is agreeable to call the office with any worsening of symptoms or onset of side effects. Patient is agreeable to call 911 or go to the nearest ER should he/she begin having SI/HI. No medication side effects or related complaints today.     MEDS ORDERED DURING VISIT:  New Medications Ordered This Visit   Medications   • Lisdexamfetamine Dimesylate (Vyvanse) 60 MG chewable tablet     Sig: Chew 1 tablet Every Morning     Dispense:  30 tablet     Refill:  0       Return in about 3 months (around 8/19/2020) for Video visit.        This visit has been rescheduled as a phone visit to comply with patient safety concerns in accordance with CDC recommendations. Total time of discussion was 12 minutes.      This document has been electronically signed by Patt Perry MD  May 19, 2020 13:57  "

## 2020-06-22 DIAGNOSIS — F90.0 ADHD (ATTENTION DEFICIT HYPERACTIVITY DISORDER), INATTENTIVE TYPE: ICD-10-CM

## 2020-07-21 DIAGNOSIS — F90.0 ADHD (ATTENTION DEFICIT HYPERACTIVITY DISORDER), INATTENTIVE TYPE: ICD-10-CM

## 2020-08-19 DIAGNOSIS — F90.0 ADHD (ATTENTION DEFICIT HYPERACTIVITY DISORDER), INATTENTIVE TYPE: ICD-10-CM

## 2020-08-26 ENCOUNTER — TELEMEDICINE (OUTPATIENT)
Dept: PSYCHIATRY | Facility: CLINIC | Age: 35
End: 2020-08-26

## 2020-08-26 DIAGNOSIS — F90.0 ATTENTION DEFICIT HYPERACTIVITY DISORDER (ADHD), PREDOMINANTLY INATTENTIVE TYPE: Primary | ICD-10-CM

## 2020-08-26 PROCEDURE — 99212 OFFICE O/P EST SF 10 MIN: CPT | Performed by: PSYCHIATRY & NEUROLOGY

## 2020-08-26 NOTE — PROGRESS NOTES
Subjective   Annalisa Fuentes is a 34 y.o. female who presents today for follow up via video chart     You have chosen to receive care through a telehealth visit.  Do you consent to use a video/audio connection for your medical care today? Yes    Chief Complaint:   decreased concentration     History of Present Illness:   Long history of ADHD,  Was  on different meds in the past, was stable on current meds   Today the pt reported feeling stable, on meds, she works from home , feels more comfortable, less distractions ,     Concentration is good on  vyvanse chewable last long enough , able to finish her tasks , with meds she is  able to stay productive to finish her work, not missing deadlines  .   The pt denied feeling depressed/hopeless/helpless,    Denied AVH/SI/HI     concentration - good on meds  Sleep is good      Precipitating and Ameliorating Factors: job related stress          PAST PSYCHIATRIC HISTORY      Previous Psychiatric Diagnoses   Axis I: Attention Deficit Disorder     Past Hospitalizations or Residential Treatment   Locations\Providers: none      Past Outpatient Treatment   Diagnosis Treated: ADD  Location: PCP      Prior Psychiatric Medications   Comments: adderall - skin picking       Consequences of Mental Disorder   Consequences: emotional distress     SOCIAL HISTORY     Number of marriages: 1  Number of children: 0  Current Relationship is: supportive  Family of Origin is: abusive     Current Living Situation   Lives with: spouse     Education   Level: some college     Employment   Job Status: full-time     Hobbies and Leisure Activities   Activity Type: quiet activities     Alcohol use   Freq. drinkin drink  Smoking History   Smoking Hx: Never smoker     Exercise   Exercise sessions (per wk): 1     Illicit Drug Use   Illicit Drugs used: none     FAMILY HISTORY OF MENTAL DISORDERS   fh Grandparents: Non-contributory  fh Mother: Non-contributory  fh Father: Addictive Disorders -  Alcoholism  fh Siblings: Non-contributory  fh Other: Non-contributory     The following portions of the patient's history were reviewed and updated as appropriate: allergies, current medications, past family history, past medical history, past social history, past surgical history and problem list.            Interval History  No Change - stable     Side Effects  Denied       Past Medical History:  Past Medical History:   Diagnosis Date   • ADHD (attention deficit hyperactivity disorder)        Social History:  Social History     Socioeconomic History   • Marital status:      Spouse name: Not on file   • Number of children: Not on file   • Years of education: Not on file   • Highest education level: Not on file   Tobacco Use   • Smoking status: Never Smoker   • Smokeless tobacco: Never Used   Substance and Sexual Activity   • Alcohol use: Yes     Comment: socially   • Drug use: No   • Sexual activity: Defer       Family History:  History reviewed. No pertinent family history.    Past Surgical History:  History reviewed. No pertinent surgical history.    Problem List:  Patient Active Problem List   Diagnosis   • ADD (attention deficit disorder)   • Encounter for long-term (current) use of other medications       Allergy:   Allergies   Allergen Reactions   • Amoxicillin Nausea And Vomiting        Discontinued Medications:  There are no discontinued medications.    Current Medications:   Current Outpatient Medications   Medication Sig Dispense Refill   • Lisdexamfetamine Dimesylate (Vyvanse) 60 MG chewable tablet Chew 1 tablet Every Morning 30 tablet 0     No current facility-administered medications for this visit.          Review of Symptoms:    Psychiatric/Behavioral: Negative for agitation, behavioral problems, confusion, decreased concentration, dysphoric mood, hallucinations, self-injury, sleep disturbance and suicidal ideas.   The patient is not nervous/anxious and is not hyperactive.        Physical  Exam:   There were no vitals taken for this visit.    Mental Status Exam:   Hygiene:   good  Cooperation:  Cooperative  Eye Contact:  Good  Psychomotor Behavior:  Appropriate  Affect:  Appropriate  Mood: normal  Hopelessness: Denies  Speech:  Normal  Thought Process:  Goal directed and Linear  Thought Content:  Normal  Suicidal:  None  Homicidal:  None  Hallucinations:  None  Delusion:  None  Memory:  Intact  Orientation:  Person, Place, Time and Situation  Reliability:  good  Insight:  Good  Judgement:  Good  Impulse Control:  Good  Physical/Medical Issues:  No      MSE from 5/19/2020   was reviewed and no changes necessary   PHQ-9 Depression Screening  Little interest or pleasure in doing things? 1   Feeling down, depressed, or hopeless? 1   Trouble falling or staying asleep, or sleeping too much? 0   Feeling tired or having little energy? 1   Poor appetite or overeating? 0   Feeling bad about yourself - or that you are a failure or have let yourself or your family down? 0   Trouble concentrating on things, such as reading the newspaper or watching television? 1   Moving or speaking so slowly that other people could have noticed? Or the opposite - being so fidgety or restless that you have been moving around a lot more than usual? 0   Thoughts that you would be better off dead, or of hurting yourself in some way? 0   PHQ-9 Total Score 4   If you checked off any problems, how difficult have these problems made it for you to do your work, take care of things at home, or get along with other people? Somewhat difficult           Never smoker    I advised Annalisa of the risks of tobacco use.     Lab Results:   No visits with results within 3 Month(s) from this visit.   Latest known visit with results is:   No results found for any previous visit.       Assessment/Plan   Problems Addressed this Visit        Other    ADD (attention deficit disorder) - Primary          Visit Diagnoses:    ICD-10-CM ICD-9-CM   1. Attention  deficit hyperactivity disorder (ADHD), predominantly inattentive type F90.0 314.00       TREATMENT PLAN/GOALS: Continue supportive psychotherapy efforts and medications as indicated. Treatment and medication options discussed during today's visit. Patient ackowledged and verbally consented to continue with current treatment plan and was educated on the importance of compliance with treatment and follow-up appointments.    MEDICATION ISSUES:  Cont vyvanse chewable  60 mg ,    long term stimulant use discussed ,     INSPECT reviewed as expected - 8/22/2020 - last refill , will fill when it is due   Will get UDS next visit   Discussed medication options and treatment plan of prescribed medication as well as the risks, benefits, and side effects including potential falls, possible impaired driving and metabolic adversities among others. Patient is agreeable to call the office with any worsening of symptoms or onset of side effects. Patient is agreeable to call 911 or go to the nearest ER should he/she begin having SI/HI. No medication side effects or related complaints today.     MEDS ORDERED DURING VISIT:  No orders of the defined types were placed in this encounter.      Return in about 4 months (around 12/26/2020).        This visit has been rescheduled as a phone visit to comply with patient safety concerns in accordance with CDC recommendations. Total time of discussion was 11 minutes.      This document has been electronically signed by Patt Perry MD  August 26, 2020 13:39

## 2020-09-17 DIAGNOSIS — F90.0 ADHD (ATTENTION DEFICIT HYPERACTIVITY DISORDER), INATTENTIVE TYPE: ICD-10-CM

## 2020-09-18 RX ORDER — LISDEXAMFETAMINE DIMESYLATE 60 MG/1
60 TABLET, CHEWABLE ORAL EVERY MORNING
Qty: 30 TABLET | Refills: 0 | Status: SHIPPED | OUTPATIENT
Start: 2020-09-18 | End: 2020-10-16 | Stop reason: SDUPTHER

## 2020-10-16 DIAGNOSIS — F90.0 ADHD (ATTENTION DEFICIT HYPERACTIVITY DISORDER), INATTENTIVE TYPE: ICD-10-CM

## 2020-10-16 RX ORDER — LISDEXAMFETAMINE DIMESYLATE 60 MG/1
60 TABLET, CHEWABLE ORAL EVERY MORNING
Qty: 30 TABLET | Refills: 0 | Status: SHIPPED | OUTPATIENT
Start: 2020-10-16 | End: 2020-11-16 | Stop reason: SDUPTHER

## 2020-11-16 DIAGNOSIS — F90.0 ADHD (ATTENTION DEFICIT HYPERACTIVITY DISORDER), INATTENTIVE TYPE: ICD-10-CM

## 2020-11-18 RX ORDER — LISDEXAMFETAMINE DIMESYLATE 60 MG/1
60 TABLET, CHEWABLE ORAL EVERY MORNING
Qty: 30 TABLET | Refills: 0 | Status: SHIPPED | OUTPATIENT
Start: 2020-11-18 | End: 2020-12-15 | Stop reason: SDUPTHER

## 2020-12-01 ENCOUNTER — OFFICE VISIT (OUTPATIENT)
Dept: PSYCHIATRY | Facility: CLINIC | Age: 35
End: 2020-12-01

## 2020-12-01 DIAGNOSIS — Z79.899 ENCOUNTER FOR LONG-TERM (CURRENT) USE OF OTHER MEDICATIONS: ICD-10-CM

## 2020-12-01 DIAGNOSIS — F90.0 ATTENTION DEFICIT HYPERACTIVITY DISORDER (ADHD), PREDOMINANTLY INATTENTIVE TYPE: Primary | ICD-10-CM

## 2020-12-01 PROCEDURE — 99213 OFFICE O/P EST LOW 20 MIN: CPT | Performed by: PSYCHIATRY & NEUROLOGY

## 2020-12-01 NOTE — PATIENT INSTRUCTIONS
Attention Deficit Hyperactivity Disorder, Adult  Attention deficit hyperactivity disorder (ADHD) is a mental health disorder that starts during childhood (neurodevelopmental disorder). For many people with ADHD, the disorder continues into the adult years. Treatment can help you manage your symptoms.  What are the causes?  The exact cause of ADHD is not known. Most experts believe genetics and environmental factors contribute to ADHD.  What increases the risk?  The following factors may make you more likely to develop this condition:  · Having a family history of ADHD.  · Being male.  · Being born to a mother who smoked or drank alcohol during pregnancy.  · Being exposed to lead or other toxins in the womb or early in life.  · Being born before 37 weeks of pregnancy (prematurely) or at a low birth weight.  · Having experienced a brain injury.  What are the signs or symptoms?  Symptoms of this condition depend on the type of ADHD. The two main types are inattentive and hyperactive-impulsive. Some people may have symptoms of both types.  Symptoms of the inattentive type include:  · Difficulty paying attention.  · Making careless mistakes.  · Not following instructions.  · Being disorganized.  · Avoiding tasks that require time and attention.  · Losing and forgetting things.  · Being easily distracted.  Symptoms of the hyperactive-impulsive type include:  · Restlessness.  · Talking too much.  · Interrupting.  · Difficulty with:  ? Sitting still.  ? Feeling motivated.  ? Relaxing.  ? Waiting in line or waiting for a turn.  In adults, this condition may lead to certain problems, such as:  · Keeping jobs.  · Performing tasks at work.  · Having stable relationships.  · Being on time or keeping to a schedule.  How is this diagnosed?  This condition is diagnosed based on your current symptoms and your history of symptoms. The diagnosis can be made by a health care provider such as a primary care provider or a mental health  care specialist.  Your health care provider may use a symptom checklist or a behavior rating scale to evaluate your symptoms. He or she may also want to talk with people who have observed your behaviors throughout your life.  How is this treated?  This condition can be treated with medicines and behavior therapy. Medicines may be the best option to reduce impulsive behaviors and improve attention. Your health care provider may recommend:  · Stimulant medicines. These are the most common medicines used for adult ADHD. They affect certain chemicals in the brain (neurotransmitters) and improve your ability to control your symptoms.  · A non-stimulant medicine for adult ADHD (atomoxetine). This medicine increases a neurotransmitter called norepinephrine. It may take weeks to months to see effects from this medicine.  Counseling and behavioral management are also important for treating ADHD. Counseling is often used along with medicine. Your health care provider may suggest:  · Cognitive behavioral therapy (CBT). This type of therapy teaches you to replace negative thoughts and actions with positive thoughts and actions. When used as part of ADHD treatment, this therapy may also include:  ? Coping strategies for organization, time management, impulse control, and stress reduction.  ? Mindfulness and meditation training.  · Behavioral management. You may work with a  who is specially trained to help people with ADHD manage and organize activities and function more effectively.  Follow these instructions at home:  Medicines    · Take over-the-counter and prescription medicines only as told by your health care provider.  · Talk with your health care provider about the possible side effects of your medicines and how to manage them.  Lifestyle    · Do not use drugs.  · Do not drink alcohol if:  ? Your health care provider tells you not to drink.  ? You are pregnant, may be pregnant, or are planning to become  pregnant.  · If you drink alcohol:  ? Limit how much you use to:  § 0-1 drink a day for women.  § 0-2 drinks a day for men.  ? Be aware of how much alcohol is in your drink. In the [place] one drink equals one 12 oz bottle of beer (355 mL), one 5 oz glass of wine (148 mL), or one 1½ oz glass of hard liquor (44 mL).  · Get enough sleep.  · Eat a healthy diet.  · Exercise regularly. Exercise can help to reduce stress and anxiety.  General instructions  · Learn as much as you can about adult ADHD, and work closely with your health care providers to find the treatments that work best for you.  · Follow the same schedule each day.  · Use reminder devices like notes, calendars, and phone apps to stay on time and organized.  · Keep all follow-up visits as told by your health care provider and therapist. This is important.  Where to find more information  A health care provider may be able to recommend resources that are available online or over the phone. You could start with:  · Attention Deficit Disorder Association (ADDA): www.add.org  · National [place] of Mental Health (NIM): www.nimh.nih.gov  Contact a health care provider if:  · Your symptoms continue to cause problems.  · You have side effects from your medicine, such as:  ? Repeated muscle twitches, coughing, or speech outbursts.  ? Sleep problems.  ? Loss of appetite.  ? Dizziness.  ? Unusually fast heartbeat.  ? Stomach pains.  ? Headaches.  · You are struggling with anxiety, depression, or substance abuse.  Get help right away if you:  · Have a severe reaction to a medicine.  If you ever feel like you may hurt yourself or others, or have thoughts about taking your own life, get help right away. You can go to the nearest emergency department or call:  · Your local emergency services (911 in the [place]  · A suicide crisis helpline, such as the National Suicide Prevention Lifeline at 1-766.725.7078. This is open 24 hours a day.  Summary  · ADHD is a mental health  disorder that starts during childhood (neurodevelopmental disorder) and often continues into the adult years.  · The exact cause of ADHD is not known. Most experts believe genetics and environmental factors contribute to ADHD.  · There is no cure for ADHD, but treatment with medicine, cognitive behavioral therapy, or behavioral management can help you manage your condition.  This information is not intended to replace advice given to you by your health care provider. Make sure you discuss any questions you have with your health care provider.  Document Revised: 05/11/2020 Document Reviewed: 05/11/2020  [employer] Patient Education © 2020 [employer]

## 2020-12-01 NOTE — PROGRESS NOTES
Subjective   Annalisa Fuentes is a 34 y.o. female who presents today for follow up     Chief Complaint:   decreased concentration     History of Present Illness:   Long history of ADHD,  Was  on different meds in the past, was stable on current meds     Today the pt reported feeling stable, on meds, she works from home , feels more comfortable, less distractions , trying to stay     Concentration is good on  vyvanse chewable last long enough , able to finish her tasks , with meds she is  able to stay productive to finish her work, not missing deadlines  .   The pt denied feeling depressed/hopeless/helpless,    Denied AVH/SI/HI     concentration - good on meds  Sleep is good      Precipitating and Ameliorating Factors: job related stress          PAST PSYCHIATRIC HISTORY      Previous Psychiatric Diagnoses   Axis I: Attention Deficit Disorder     Past Hospitalizations or Residential Treatment   Locations\Providers: none      Past Outpatient Treatment   Diagnosis Treated: ADD  Location: PCP      Prior Psychiatric Medications   Comments: adderall - skin picking       Consequences of Mental Disorder   Consequences: emotional distress     SOCIAL HISTORY     Number of marriages: 1  Number of children: 0  Current Relationship is: supportive  Family of Origin is: abusive     Current Living Situation   Lives with: spouse     Education   Level: some college     Employment   Job Status: full-time     Hobbies and Leisure Activities   Activity Type: quiet activities     Alcohol use   Freq. drinkin drink  Smoking History   Smoking Hx: Never smoker     Exercise   Exercise sessions (per wk): 1     Illicit Drug Use   Illicit Drugs used: none     FAMILY HISTORY OF MENTAL DISORDERS   fh Grandparents: Non-contributory  fh Mother: Non-contributory  fh Father: Addictive Disorders - Alcoholism  fh Siblings: Non-contributory  fh Other: Non-contributory     The following portions of the patient's history were reviewed and updated as  appropriate: allergies, current medications, past family history, past medical history, past social history, past surgical history and problem list.            Interval History  No Change - stable     Side Effects  Denied       Past Medical History:  Past Medical History:   Diagnosis Date   • ADHD (attention deficit hyperactivity disorder)        Social History:  Social History     Socioeconomic History   • Marital status:      Spouse name: Not on file   • Number of children: Not on file   • Years of education: Not on file   • Highest education level: Not on file   Tobacco Use   • Smoking status: Never Smoker   • Smokeless tobacco: Never Used   Substance and Sexual Activity   • Alcohol use: Yes     Comment: socially   • Drug use: No   • Sexual activity: Defer       Family History:  History reviewed. No pertinent family history.    Past Surgical History:  History reviewed. No pertinent surgical history.    Problem List:  Patient Active Problem List   Diagnosis   • ADD (attention deficit disorder)   • Encounter for long-term (current) use of other medications       Allergy:   Allergies   Allergen Reactions   • Amoxicillin Nausea And Vomiting        Discontinued Medications:  There are no discontinued medications.    Current Medications:   Current Outpatient Medications   Medication Sig Dispense Refill   • Lisdexamfetamine Dimesylate (Vyvanse) 60 MG chewable tablet Chew 1 tablet Every Morning 30 tablet 0     No current facility-administered medications for this visit.          Review of Symptoms:    Psychiatric/Behavioral: Negative for agitation, behavioral problems, confusion, decreased concentration, dysphoric mood, hallucinations, self-injury, sleep disturbance and suicidal ideas.   The patient is not nervous/anxious and is not hyperactive.        Physical Exam:   There were no vitals taken for this visit.    Mental Status Exam:   Hygiene:   good  Cooperation:  Cooperative  Eye Contact:  Good  Psychomotor  Behavior:  Appropriate  Affect:  Appropriate  Mood: normal  Hopelessness: Denies  Speech:  Normal  Thought Process:  Goal directed and Linear  Thought Content:  Normal  Suicidal:  None  Homicidal:  None  Hallucinations:  None  Delusion:  None  Memory:  Intact  Orientation:  Person, Place, Time and Situation  Reliability:  good  Insight:  Good  Judgement:  Good  Impulse Control:  Good  Physical/Medical Issues:  No      MSE from 5/19/2020   was reviewed and no changes necessary   PHQ-9 Depression Screening  Little interest or pleasure in doing things? 1   Feeling down, depressed, or hopeless? 1   Trouble falling or staying asleep, or sleeping too much? 0   Feeling tired or having little energy? 0   Poor appetite or overeating? 0   Feeling bad about yourself - or that you are a failure or have let yourself or your family down? 1   Trouble concentrating on things, such as reading the newspaper or watching television? 2   Moving or speaking so slowly that other people could have noticed? Or the opposite - being so fidgety or restless that you have been moving around a lot more than usual? 0   Thoughts that you would be better off dead, or of hurting yourself in some way? 0   PHQ-9 Total Score 5   If you checked off any problems, how difficult have these problems made it for you to do your work, take care of things at home, or get along with other people? Very difficult           Never smoker    I advised Annalisa of the risks of tobacco use.     Lab Results:   No visits with results within 3 Month(s) from this visit.   Latest known visit with results is:   No results found for any previous visit.       Assessment/Plan   Problems Addressed this Visit        Other    ADD (attention deficit disorder) - Primary    Encounter for long-term (current) use of other medications      Diagnoses       Codes Comments    Attention deficit hyperactivity disorder (ADHD), predominantly inattentive type    -  Primary ICD-10-CM:  F90.0  ICD-9-CM: 314.00     Encounter for long-term (current) use of other medications     ICD-10-CM: Z79.899  ICD-9-CM: V58.69           Visit Diagnoses:    ICD-10-CM ICD-9-CM   1. Attention deficit hyperactivity disorder (ADHD), predominantly inattentive type  F90.0 314.00   2. Encounter for long-term (current) use of other medications  Z79.899 V58.69       TREATMENT PLAN/GOALS: Continue supportive psychotherapy efforts and medications as indicated. Treatment and medication options discussed during today's visit. Patient ackowledged and verbally consented to continue with current treatment plan and was educated on the importance of compliance with treatment and follow-up appointments.    MEDICATION ISSUES:  Cont vyvanse chewable  60 mg ,    long term stimulant use discussed , the pt is using for work , her job requires concentration     INSPECT reviewed as expected - 11/18/2020 - last refill , will fill when it is due   UDS today    Discussed medication options and treatment plan of prescribed medication as well as the risks, benefits, and side effects including potential falls, possible impaired driving and metabolic adversities among others. Patient is agreeable to call the office with any worsening of symptoms or onset of side effects. Patient is agreeable to call 911 or go to the nearest ER should he/she begin having SI/HI. No medication side effects or related complaints today.     MEDS ORDERED DURING VISIT:  No orders of the defined types were placed in this encounter.  will fill when it is due     Return in about 4 months (around 4/1/2021).          This document has been electronically signed by Patt Perry MD  December 1, 2020 15:06 EST

## 2020-12-15 DIAGNOSIS — F90.0 ADHD (ATTENTION DEFICIT HYPERACTIVITY DISORDER), INATTENTIVE TYPE: ICD-10-CM

## 2020-12-16 RX ORDER — LISDEXAMFETAMINE DIMESYLATE 60 MG/1
60 TABLET, CHEWABLE ORAL EVERY MORNING
Qty: 30 TABLET | Refills: 0 | Status: SHIPPED | OUTPATIENT
Start: 2020-12-16 | End: 2021-01-11 | Stop reason: SDUPTHER

## 2021-01-11 DIAGNOSIS — F90.0 ADHD (ATTENTION DEFICIT HYPERACTIVITY DISORDER), INATTENTIVE TYPE: ICD-10-CM

## 2021-01-13 RX ORDER — LISDEXAMFETAMINE DIMESYLATE 60 MG/1
60 TABLET, CHEWABLE ORAL EVERY MORNING
Qty: 30 TABLET | Refills: 0 | Status: SHIPPED | OUTPATIENT
Start: 2021-01-13 | End: 2021-02-09 | Stop reason: SDUPTHER

## 2021-02-09 DIAGNOSIS — F90.0 ADHD (ATTENTION DEFICIT HYPERACTIVITY DISORDER), INATTENTIVE TYPE: ICD-10-CM

## 2021-02-10 RX ORDER — LISDEXAMFETAMINE DIMESYLATE 60 MG/1
60 TABLET, CHEWABLE ORAL EVERY MORNING
Qty: 30 TABLET | Refills: 0 | Status: SHIPPED | OUTPATIENT
Start: 2021-02-10 | End: 2021-03-09 | Stop reason: SDUPTHER

## 2021-03-09 DIAGNOSIS — F90.0 ADHD (ATTENTION DEFICIT HYPERACTIVITY DISORDER), INATTENTIVE TYPE: ICD-10-CM

## 2021-03-10 RX ORDER — LISDEXAMFETAMINE DIMESYLATE 60 MG/1
60 TABLET, CHEWABLE ORAL EVERY MORNING
Qty: 30 TABLET | Refills: 0 | Status: SHIPPED | OUTPATIENT
Start: 2021-03-10 | End: 2021-04-01 | Stop reason: SDUPTHER

## 2021-04-01 ENCOUNTER — OFFICE VISIT (OUTPATIENT)
Dept: PSYCHIATRY | Facility: CLINIC | Age: 36
End: 2021-04-01

## 2021-04-01 DIAGNOSIS — F90.0 ADHD (ATTENTION DEFICIT HYPERACTIVITY DISORDER), INATTENTIVE TYPE: Primary | Chronic | ICD-10-CM

## 2021-04-01 PROCEDURE — 99213 OFFICE O/P EST LOW 20 MIN: CPT | Performed by: PSYCHIATRY & NEUROLOGY

## 2021-04-01 RX ORDER — LISDEXAMFETAMINE DIMESYLATE 60 MG/1
60 TABLET, CHEWABLE ORAL EVERY MORNING
Qty: 30 TABLET | Refills: 0 | Status: SHIPPED | OUTPATIENT
Start: 2021-04-01 | End: 2021-05-05 | Stop reason: SDUPTHER

## 2021-04-01 NOTE — PROGRESS NOTES
Subjective   Annalisa Fuentes is a 35 y.o. female who presents today for follow up     Chief Complaint:   decreased concentration     History of Present Illness:   Long history of ADHD,  Was  on different meds in the past, was stable on current meds     Today the pt reported feeling stable, on meds, she works from home and likes that, she has more structure, feels more comfortable, less distractions , trying to stay busy     Concentration is good on  vyvanse chewable last long enough , able to finish her tasks , with meds she is  able to stay productive to finish her work, not missing deadlines  .   The pt denied feeling depressed/hopeless/helpless,    Denied AVH/SI/HI     concentration - good on meds  Sleep is good      Precipitating and Ameliorating Factors: job related stress  alleviating factors - to spend time with baby animals (suirrels)   Dogs         PAST PSYCHIATRIC HISTORY      Previous Psychiatric Diagnoses   Axis I: Attention Deficit Disorder     Past Hospitalizations or Residential Treatment   Locations\Providers: none      Past Outpatient Treatment   Diagnosis Treated: ADD  Location: PCP      Prior Psychiatric Medications   Comments: adderall - skin picking       Consequences of Mental Disorder   Consequences: emotional distress     SOCIAL HISTORY     Number of marriages: 1  Number of children: 0  Current Relationship is: supportive  Family of Origin is: abusive     Current Living Situation   Lives with: spouse     Education   Level: some college     Employment   Job Status: full-time     Hobbies and Leisure Activities   Activity Type: quiet activities     Alcohol use   Freq. drinkin drink  Smoking History   Smoking Hx: Never smoker     Exercise   Exercise sessions (per wk): 1     Illicit Drug Use   Illicit Drugs used: none     FAMILY HISTORY OF MENTAL DISORDERS   fh Grandparents: Non-contributory  fh Mother: Non-contributory  fh Father: Addictive Disorders - Alcoholism  fh Siblings:  Non-contributory  fh Other: Non-contributory     The following portions of the patient's history were reviewed and updated as appropriate: allergies, current medications, past family history, past medical history, past social history, past surgical history and problem list.            Interval History  No Change - stable     Side Effects  Denied       Past Medical History:  Past Medical History:   Diagnosis Date   • ADHD (attention deficit hyperactivity disorder)        Social History:  Social History     Socioeconomic History   • Marital status:      Spouse name: Not on file   • Number of children: Not on file   • Years of education: Not on file   • Highest education level: Not on file   Tobacco Use   • Smoking status: Never Smoker   • Smokeless tobacco: Never Used   Substance and Sexual Activity   • Alcohol use: Yes     Comment: socially   • Drug use: No   • Sexual activity: Defer       Family History:  History reviewed. No pertinent family history.    Past Surgical History:  History reviewed. No pertinent surgical history.    Problem List:  Patient Active Problem List   Diagnosis   • ADD (attention deficit disorder)   • Encounter for long-term (current) use of other medications       Allergy:   Allergies   Allergen Reactions   • Amoxicillin Nausea And Vomiting        Discontinued Medications:  Medications Discontinued During This Encounter   Medication Reason   • Lisdexamfetamine Dimesylate (Vyvanse) 60 MG chewable tablet Reorder       Current Medications:   Current Outpatient Medications   Medication Sig Dispense Refill   • Lisdexamfetamine Dimesylate (Vyvanse) 60 MG chewable tablet Chew 1 tablet Every Morning 30 tablet 0     No current facility-administered medications for this visit.         Review of Symptoms:    Psychiatric/Behavioral: Negative for agitation, behavioral problems, confusion, decreased concentration, dysphoric mood, hallucinations, self-injury, sleep disturbance and suicidal ideas.    The patient is not nervous/anxious and is not hyperactive.        Physical Exam:   There were no vitals taken for this visit.    Mental Status Exam:   Hygiene:   good  Cooperation:  Cooperative  Eye Contact:  Good  Psychomotor Behavior:  Appropriate  Affect:  Appropriate  Mood: normal  Hopelessness: Denies  Speech:  Normal  Thought Process:  Goal directed and Linear  Thought Content:  Normal  Suicidal:  None  Homicidal:  None  Hallucinations:  None  Delusion:  None  Memory:  Intact  Orientation:  Person, Place, Time and Situation  Reliability:  good  Insight:  Good  Judgement:  Good  Impulse Control:  Good  Physical/Medical Issues:  No      MSE from 12/1/2020   was reviewed and no changes necessary   PHQ-9 Depression Screening  Little interest or pleasure in doing things? 1   Feeling down, depressed, or hopeless? 1   Trouble falling or staying asleep, or sleeping too much? 0   Feeling tired or having little energy? 1   Poor appetite or overeating? 0   Feeling bad about yourself - or that you are a failure or have let yourself or your family down? 1   Trouble concentrating on things, such as reading the newspaper or watching television? 2   Moving or speaking so slowly that other people could have noticed? Or the opposite - being so fidgety or restless that you have been moving around a lot more than usual? 0   Thoughts that you would be better off dead, or of hurting yourself in some way? 0   PHQ-9 Total Score 6   If you checked off any problems, how difficult have these problems made it for you to do your work, take care of things at home, or get along with other people? Very difficult           Never smoker    I advised Annalisa of the risks of tobacco use.     Lab Results:   No visits with results within 3 Month(s) from this visit.   Latest known visit with results is:   No results found for any previous visit.       Assessment/Plan   Problems Addressed this Visit     None      Visit Diagnoses     ADHD (attention  deficit hyperactivity disorder), inattentive type  (Chronic)   -  Primary    Relevant Medications    Lisdexamfetamine Dimesylate (Vyvanse) 60 MG chewable tablet      Diagnoses       Codes Comments    ADHD (attention deficit hyperactivity disorder), inattentive type    -  Primary ICD-10-CM: F90.0  ICD-9-CM: 314.00           Visit Diagnoses:    ICD-10-CM ICD-9-CM   1. ADHD (attention deficit hyperactivity disorder), inattentive type  F90.0 314.00       TREATMENT PLAN/GOALS: Continue supportive psychotherapy efforts and medications as indicated. Treatment and medication options discussed during today's visit. Patient ackowledged and verbally consented to continue with current treatment plan and was educated on the importance of compliance with treatment and follow-up appointments.    MEDICATION ISSUES:  1. ADHD inattentive type -  Cont vyvanse chewable  60 mg ,    long term stimulant use discussed , the pt is using for work , her job requires concentration     INSPECT reviewed as expected - 3/12/2021  - last refill    UDS on 12/1/2021 consistent   PHQ scored 6 and indicated mild depression     Discussed medication options and treatment plan of prescribed medication as well as the risks, benefits, and side effects including potential falls, possible impaired driving and metabolic adversities among others. Patient is agreeable to call the office with any worsening of symptoms or onset of side effects. Patient is agreeable to call 911 or go to the nearest ER should he/she begin having SI/HI. No medication side effects or related complaints today.     MEDS ORDERED DURING VISIT:  New Medications Ordered This Visit   Medications   • Lisdexamfetamine Dimesylate (Vyvanse) 60 MG chewable tablet     Sig: Chew 1 tablet Every Morning     Dispense:  30 tablet     Refill:  0     Please dispense on or after aPRIL 8, 2021       Return in about 4 months (around 8/1/2021).          This document has been electronically signed by Patt  RAMON Perry MD  April 1, 2021 11:21 EDT

## 2021-04-01 NOTE — PATIENT INSTRUCTIONS
Attention Deficit Hyperactivity Disorder, Adult  Attention deficit hyperactivity disorder (ADHD) is a mental health disorder that starts during childhood (neurodevelopmental disorder). For many people with ADHD, the disorder continues into the adult years. Treatment can help you manage your symptoms.  What are the causes?  The exact cause of ADHD is not known. Most experts believe genetics and environmental factors contribute to ADHD.  What increases the risk?  The following factors may make you more likely to develop this condition:  · Having a family history of ADHD.  · Being male.  · Being born to a mother who smoked or drank alcohol during pregnancy.  · Being exposed to lead or other toxins in the womb or early in life.  · Being born before 37 weeks of pregnancy (prematurely) or at a low birth weight.  · Having experienced a brain injury.  What are the signs or symptoms?  Symptoms of this condition depend on the type of ADHD. The two main types are inattentive and hyperactive-impulsive. Some people may have symptoms of both types.  Symptoms of the inattentive type include:  · Difficulty paying attention.  · Making careless mistakes.  · Not following instructions.  · Being disorganized.  · Avoiding tasks that require time and attention.  · Losing and forgetting things.  · Being easily distracted.  Symptoms of the hyperactive-impulsive type include:  · Restlessness.  · Talking too much.  · Interrupting.  · Difficulty with:  ? Sitting still.  ? Feeling motivated.  ? Relaxing.  ? Waiting in line or waiting for a turn.  In adults, this condition may lead to certain problems, such as:  · Keeping jobs.  · Performing tasks at work.  · Having stable relationships.  · Being on time or keeping to a schedule.  How is this diagnosed?  This condition is diagnosed based on your current symptoms and your history of symptoms. The diagnosis can be made by a health care provider such as a primary care provider or a mental health  care specialist.  Your health care provider may use a symptom checklist or a behavior rating scale to evaluate your symptoms. He or she may also want to talk with people who have observed your behaviors throughout your life.  How is this treated?  This condition can be treated with medicines and behavior therapy. Medicines may be the best option to reduce impulsive behaviors and improve attention. Your health care provider may recommend:  · Stimulant medicines. These are the most common medicines used for adult ADHD. They affect certain chemicals in the brain (neurotransmitters) and improve your ability to control your symptoms.  · A non-stimulant medicine for adult ADHD (atomoxetine). This medicine increases a neurotransmitter called norepinephrine. It may take weeks to months to see effects from this medicine.  Counseling and behavioral management are also important for treating ADHD. Counseling is often used along with medicine. Your health care provider may suggest:  · Cognitive behavioral therapy (CBT). This type of therapy teaches you to replace negative thoughts and actions with positive thoughts and actions. When used as part of ADHD treatment, this therapy may also include:  ? Coping strategies for organization, time management, impulse control, and stress reduction.  ? Mindfulness and meditation training.  · Behavioral management. You may work with a  who is specially trained to help people with ADHD manage and organize activities and function more effectively.  Follow these instructions at home:  Medicines    · Take over-the-counter and prescription medicines only as told by your health care provider.  · Talk with your health care provider about the possible side effects of your medicines and how to manage them.  Lifestyle    · Do not use drugs.  · Do not drink alcohol if:  ? Your health care provider tells you not to drink.  ? You are pregnant, may be pregnant, or are planning to become  pregnant.  · If you drink alcohol:  ? Limit how much you use to:  § 0-1 drink a day for women.  § 0-2 drinks a day for men.  ? Be aware of how much alcohol is in your drink. In the [place] one drink equals one 12 oz bottle of beer (355 mL), one 5 oz glass of wine (148 mL), or one 1½ oz glass of hard liquor (44 mL).  · Get enough sleep.  · Eat a healthy diet.  · Exercise regularly. Exercise can help to reduce stress and anxiety.  General instructions  · Learn as much as you can about adult ADHD, and work closely with your health care providers to find the treatments that work best for you.  · Follow the same schedule each day.  · Use reminder devices like notes, calendars, and phone apps to stay on time and organized.  · Keep all follow-up visits as told by your health care provider and therapist. This is important.  Where to find more information  A health care provider may be able to recommend resources that are available online or over the phone. You could start with:  · Attention Deficit Disorder Association (ADDA): www.add.org  · National Reading of Mental Health (NIM): www.nimh.nih.gov  Contact a health care provider if:  · Your symptoms continue to cause problems.  · You have side effects from your medicine, such as:  ? Repeated muscle twitches, coughing, or speech outbursts.  ? Sleep problems.  ? Loss of appetite.  ? Dizziness.  ? Unusually fast heartbeat.  ? Stomach pains.  ? Headaches.  · You are struggling with anxiety, depression, or substance abuse.  Get help right away if you:  · Have a severe reaction to a medicine.  If you ever feel like you may hurt yourself or others, or have thoughts about taking your own life, get help right away. You can go to the nearest emergency department or call:  · Your local emergency services (911 in the [place]  · A suicide crisis helpline, such as the National Suicide Prevention Lifeline at 1-253.532.3219. This is open 24 hours a day.  Summary  · ADHD is a mental health  disorder that starts during childhood (neurodevelopmental disorder) and often continues into the adult years.  · The exact cause of ADHD is not known. Most experts believe genetics and environmental factors contribute to ADHD.  · There is no cure for ADHD, but treatment with medicine, cognitive behavioral therapy, or behavioral management can help you manage your condition.  This information is not intended to replace advice given to you by your health care provider. Make sure you discuss any questions you have with your health care provider.  Document Revised: 05/11/2020 Document Reviewed: 05/11/2020  [employer] Patient Education © 2021 [employer]

## 2021-05-05 DIAGNOSIS — F90.0 ADHD (ATTENTION DEFICIT HYPERACTIVITY DISORDER), INATTENTIVE TYPE: Chronic | ICD-10-CM

## 2021-05-05 RX ORDER — LISDEXAMFETAMINE DIMESYLATE 60 MG/1
60 TABLET, CHEWABLE ORAL EVERY MORNING
Qty: 30 TABLET | Refills: 0 | Status: SHIPPED | OUTPATIENT
Start: 2021-05-05 | End: 2021-06-02 | Stop reason: SDUPTHER

## 2021-06-02 DIAGNOSIS — F90.0 ADHD (ATTENTION DEFICIT HYPERACTIVITY DISORDER), INATTENTIVE TYPE: Chronic | ICD-10-CM

## 2021-06-02 RX ORDER — LISDEXAMFETAMINE DIMESYLATE 60 MG/1
60 TABLET, CHEWABLE ORAL EVERY MORNING
Qty: 30 TABLET | Refills: 0 | Status: SHIPPED | OUTPATIENT
Start: 2021-06-02 | End: 2021-07-04 | Stop reason: SDUPTHER

## 2021-07-04 DIAGNOSIS — F90.0 ADHD (ATTENTION DEFICIT HYPERACTIVITY DISORDER), INATTENTIVE TYPE: Chronic | ICD-10-CM

## 2021-07-06 RX ORDER — LISDEXAMFETAMINE DIMESYLATE 60 MG/1
60 TABLET, CHEWABLE ORAL EVERY MORNING
Qty: 30 TABLET | Refills: 0 | Status: SHIPPED | OUTPATIENT
Start: 2021-07-06 | End: 2021-07-29 | Stop reason: SDUPTHER

## 2021-07-29 ENCOUNTER — OFFICE VISIT (OUTPATIENT)
Dept: PSYCHIATRY | Facility: CLINIC | Age: 36
End: 2021-07-29

## 2021-07-29 DIAGNOSIS — F90.0 ADHD (ATTENTION DEFICIT HYPERACTIVITY DISORDER), INATTENTIVE TYPE: Chronic | ICD-10-CM

## 2021-07-29 PROCEDURE — 99213 OFFICE O/P EST LOW 20 MIN: CPT | Performed by: PSYCHIATRY & NEUROLOGY

## 2021-07-29 RX ORDER — LISDEXAMFETAMINE DIMESYLATE 60 MG/1
60 TABLET, CHEWABLE ORAL EVERY MORNING
Qty: 30 TABLET | Refills: 0 | Status: SHIPPED | OUTPATIENT
Start: 2021-07-29 | End: 2021-08-31 | Stop reason: SDUPTHER

## 2021-07-29 NOTE — PROGRESS NOTES
Subjective   Annalisa Fuentes is a 35 y.o. female who presents today for follow up     Chief Complaint:   decreased concentration     History of Present Illness:   Long history of ADHD,  Was  on different meds in the past, was stable on current meds     Today the pt reported feeling stable, on meds, no major changes,  feels more comfortable, less distractions , trying to stay busy     Concentration is good on  vyvanse chewable last long enough , able to finish her tasks , with meds she is  able to stay productive to finish her work, not missing deadlines  .   Denied side effects     The pt denied feeling depressed/hopeless/helpless,    Denied AVH/SI/HI     concentration - good on meds  Sleep is good and restorative       Precipitating and Ameliorating Factors: job related stress  alleviating factors - to spend time with baby animals (suirrels)   Dogs         PAST PSYCHIATRIC HISTORY      Previous Psychiatric Diagnoses   Axis I: Attention Deficit Disorder     Past Hospitalizations or Residential Treatment   Locations\Providers: none      Past Outpatient Treatment   Diagnosis Treated: ADD  Location: PCP      Prior Psychiatric Medications   Comments: adderall - skin picking       Consequences of Mental Disorder   Consequences: emotional distress     SOCIAL HISTORY     Number of marriages: 1  Number of children: 0  Current Relationship is: supportive  Family of Origin is: abusive     Current Living Situation   Lives with: spouse     Education   Level: some college     Employment   Job Status: full-time     Hobbies and Leisure Activities   Activity Type: quiet activities     Alcohol use   Freq. drinkin drink  Smoking History   Smoking Hx: Never smoker     Exercise   Exercise sessions (per wk): 1     Illicit Drug Use   Illicit Drugs used: none     FAMILY HISTORY OF MENTAL DISORDERS   fh Grandparents: Non-contributory  fh Mother: Non-contributory  fh Father: Addictive Disorders - Alcoholism  fh Siblings:  Non-contributory  fh Other: Non-contributory     The following portions of the patient's history were reviewed and updated as appropriate: allergies, current medications, past family history, past medical history, past social history, past surgical history and problem list.            Interval History  No Change - stable     Side Effects  Denied       Past Medical History:  Past Medical History:   Diagnosis Date   • ADHD (attention deficit hyperactivity disorder)        Social History:  Social History     Socioeconomic History   • Marital status:      Spouse name: Not on file   • Number of children: Not on file   • Years of education: Not on file   • Highest education level: Not on file   Tobacco Use   • Smoking status: Never Smoker   • Smokeless tobacco: Never Used   Substance and Sexual Activity   • Alcohol use: Yes     Comment: socially   • Drug use: No   • Sexual activity: Defer       Family History:  History reviewed. No pertinent family history.    Past Surgical History:  No past surgical history on file.    Problem List:  Patient Active Problem List   Diagnosis   • ADD (attention deficit disorder)   • Encounter for long-term (current) use of other medications       Allergy:   Allergies   Allergen Reactions   • Amoxicillin Nausea And Vomiting        Discontinued Medications:  Medications Discontinued During This Encounter   Medication Reason   • Lisdexamfetamine Dimesylate (Vyvanse) 60 MG chewable tablet Reorder       Current Medications:   Current Outpatient Medications   Medication Sig Dispense Refill   • Lisdexamfetamine Dimesylate (Vyvanse) 60 MG chewable tablet Chew 1 tablet Every Morning 30 tablet 0     No current facility-administered medications for this visit.         Review of Symptoms:    Psychiatric/Behavioral: Negative for agitation, behavioral problems, confusion, decreased concentration, dysphoric mood, hallucinations, self-injury, sleep disturbance and suicidal ideas.   The patient is  not nervous/anxious and is not hyperactive.        Physical Exam:   There were no vitals taken for this visit.    Mental Status Exam:   Hygiene:   good  Cooperation:  Cooperative  Eye Contact:  Good  Psychomotor Behavior:  Appropriate  Affect:  Appropriate  Mood: normal  Hopelessness: Denies  Speech:  Normal  Thought Process:  Goal directed and Linear  Thought Content:  Normal  Suicidal:  None  Homicidal:  None  Hallucinations:  None  Delusion:  None  Memory:  Intact  Orientation:  Person, Place, Time and Situation  Reliability:  good  Insight:  Good  Judgement:  Good  Impulse Control:  Good  Physical/Medical Issues:  No      MSE from 04/1/2021   was reviewed and no changes necessary     PHQ-9 Depression Screening  Little interest or pleasure in doing things? 1   Feeling down, depressed, or hopeless? 1   Trouble falling or staying asleep, or sleeping too much? 0   Feeling tired or having little energy? 1   Poor appetite or overeating? 0   Feeling bad about yourself - or that you are a failure or have let yourself or your family down? 1   Trouble concentrating on things, such as reading the newspaper or watching television? 1   Moving or speaking so slowly that other people could have noticed? Or the opposite - being so fidgety or restless that you have been moving around a lot more than usual? 0   Thoughts that you would be better off dead, or of hurting yourself in some way? 0   PHQ-9 Total Score 5   If you checked off any problems, how difficult have these problems made it for you to do your work, take care of things at home, or get along with other people? Somewhat difficult           Never smoker    I advised Annalisa of the risks of tobacco use.     Lab Results:   No visits with results within 3 Month(s) from this visit.   Latest known visit with results is:   No results found for any previous visit.       Assessment/Plan   Problems Addressed this Visit     None      Visit Diagnoses     ADHD (attention deficit  hyperactivity disorder), inattentive type  (Chronic)       Relevant Medications    Lisdexamfetamine Dimesylate (Vyvanse) 60 MG chewable tablet      Diagnoses       Codes Comments    ADHD (attention deficit hyperactivity disorder), inattentive type     ICD-10-CM: F90.0  ICD-9-CM: 314.00           Visit Diagnoses:    ICD-10-CM ICD-9-CM   1. ADHD (attention deficit hyperactivity disorder), inattentive type  F90.0 314.00       TREATMENT PLAN/GOALS: Continue supportive psychotherapy efforts and medications as indicated. Treatment and medication options discussed during today's visit. Patient ackowledged and verbally consented to continue with current treatment plan and was educated on the importance of compliance with treatment and follow-up appointments.    MEDICATION ISSUES:  1. ADHD inattentive type -  Cont vyvanse chewable  60 mg ,    long term stimulant use discussed , the pt is using for work , her job requires concentration  No changes necessary      INSPECT reviewed as expected - 7/6/2021  - vyvanse last refill      PHQ scored 5 and indicated mild depression     Discussed medication options and treatment plan of prescribed medication as well as the risks, benefits, and side effects including potential falls, possible impaired driving and metabolic adversities among others. Patient is agreeable to call the office with any worsening of symptoms or onset of side effects. Patient is agreeable to call 911 or go to the nearest ER should he/she begin having SI/HI. No medication side effects or related complaints today.     MEDS ORDERED DURING VISIT:  New Medications Ordered This Visit   Medications   • Lisdexamfetamine Dimesylate (Vyvanse) 60 MG chewable tablet     Sig: Chew 1 tablet Every Morning     Dispense:  30 tablet     Refill:  0       Return in about 4 months (around 11/29/2021).          This document has been electronically signed by Patt Perry MD  July 29, 2021 15:32 EDT

## 2021-07-29 NOTE — PATIENT INSTRUCTIONS
Attention Deficit Hyperactivity Disorder, Adult  Attention deficit hyperactivity disorder (ADHD) is a mental health disorder that starts during childhood (neurodevelopmental disorder). For many people with ADHD, the disorder continues into the adult years. Treatment can help you manage your symptoms.  What are the causes?  The exact cause of ADHD is not known. Most experts believe genetics and environmental factors contribute to ADHD.  What increases the risk?  The following factors may make you more likely to develop this condition:  · Having a family history of ADHD.  · Being male.  · Being born to a mother who smoked or drank alcohol during pregnancy.  · Being exposed to lead or other toxins in the womb or early in life.  · Being born before 37 weeks of pregnancy (prematurely) or at a low birth weight.  · Having experienced a brain injury.  What are the signs or symptoms?  Symptoms of this condition depend on the type of ADHD. The two main types are inattentive and hyperactive-impulsive. Some people may have symptoms of both types.  Symptoms of the inattentive type include:  · Difficulty paying attention.  · Making careless mistakes.  · Not following instructions.  · Being disorganized.  · Avoiding tasks that require time and attention.  · Losing and forgetting things.  · Being easily distracted.  Symptoms of the hyperactive-impulsive type include:  · Restlessness.  · Talking too much.  · Interrupting.  · Difficulty with:  ? Sitting still.  ? Feeling motivated.  ? Relaxing.  ? Waiting in line or waiting for a turn.  In adults, this condition may lead to certain problems, such as:  · Keeping jobs.  · Performing tasks at work.  · Having stable relationships.  · Being on time or keeping to a schedule.  How is this diagnosed?  This condition is diagnosed based on your current symptoms and your history of symptoms. The diagnosis can be made by a health care provider such as a primary care provider or a mental health  care specialist.  Your health care provider may use a symptom checklist or a behavior rating scale to evaluate your symptoms. He or she may also want to talk with people who have observed your behaviors throughout your life.  How is this treated?  This condition can be treated with medicines and behavior therapy. Medicines may be the best option to reduce impulsive behaviors and improve attention. Your health care provider may recommend:  · Stimulant medicines. These are the most common medicines used for adult ADHD. They affect certain chemicals in the brain (neurotransmitters) and improve your ability to control your symptoms.  · A non-stimulant medicine for adult ADHD (atomoxetine). This medicine increases a neurotransmitter called norepinephrine. It may take weeks to months to see effects from this medicine.  Counseling and behavioral management are also important for treating ADHD. Counseling is often used along with medicine. Your health care provider may suggest:  · Cognitive behavioral therapy (CBT). This type of therapy teaches you to replace negative thoughts and actions with positive thoughts and actions. When used as part of ADHD treatment, this therapy may also include:  ? Coping strategies for organization, time management, impulse control, and stress reduction.  ? Mindfulness and meditation training.  · Behavioral management. You may work with a  who is specially trained to help people with ADHD manage and organize activities and function more effectively.  Follow these instructions at home:  Medicines    · Take over-the-counter and prescription medicines only as told by your health care provider.  · Talk with your health care provider about the possible side effects of your medicines and how to manage them.  Lifestyle    · Do not use drugs.  · Do not drink alcohol if:  ? Your health care provider tells you not to drink.  ? You are pregnant, may be pregnant, or are planning to become  pregnant.  · If you drink alcohol:  ? Limit how much you use to:  § 0-1 drink a day for women.  § 0-2 drinks a day for men.  ? Be aware of how much alcohol is in your drink. In the [place] one drink equals one 12 oz bottle of beer (355 mL), one 5 oz glass of wine (148 mL), or one 1½ oz glass of hard liquor (44 mL).  · Get enough sleep.  · Eat a healthy diet.  · Exercise regularly. Exercise can help to reduce stress and anxiety.  General instructions  · Learn as much as you can about adult ADHD, and work closely with your health care providers to find the treatments that work best for you.  · Follow the same schedule each day.  · Use reminder devices like notes, calendars, and phone apps to stay on time and organized.  · Keep all follow-up visits as told by your health care provider and therapist. This is important.  Where to find more information  A health care provider may be able to recommend resources that are available online or over the phone. You could start with:  · Attention Deficit Disorder Association (ADDA): www.add.org  · [place] of Mental Health (NIM): www.nimh.nih.gov  Contact a health care provider if:  · Your symptoms continue to cause problems.  · You have side effects from your medicine, such as:  ? Repeated muscle twitches, coughing, or speech outbursts.  ? Sleep problems.  ? Loss of appetite.  ? Dizziness.  ? Unusually fast heartbeat.  ? Stomach pains.  ? Headaches.  · You are struggling with anxiety, depression, or substance abuse.  Get help right away if you:  · Have a severe reaction to a medicine.  If you ever feel like you may hurt yourself or others, or have thoughts about taking your own life, get help right away. You can go to the nearest emergency department or call:  · Your local emergency services (911 in the [place]  · A suicide crisis helpline, such as the National Suicide Prevention Lifeline at 1-544.335.5636. This is open 24 hours a day.  Summary  · ADHD is a mental health  disorder that starts during childhood (neurodevelopmental disorder) and often continues into the adult years.  · The exact cause of ADHD is not known. Most experts believe genetics and environmental factors contribute to ADHD.  · There is no cure for ADHD, but treatment with medicine, cognitive behavioral therapy, or behavioral management can help you manage your condition.  This information is not intended to replace advice given to you by your health care provider. Make sure you discuss any questions you have with your health care provider.  Document Revised: 05/11/2020 Document Reviewed: 05/11/2020  [employer] Patient Education © 2021 [employer]

## 2021-08-31 DIAGNOSIS — F90.0 ADHD (ATTENTION DEFICIT HYPERACTIVITY DISORDER), INATTENTIVE TYPE: Chronic | ICD-10-CM

## 2021-09-01 RX ORDER — LISDEXAMFETAMINE DIMESYLATE 60 MG/1
60 TABLET, CHEWABLE ORAL EVERY MORNING
Qty: 30 TABLET | Refills: 0 | Status: SHIPPED | OUTPATIENT
Start: 2021-09-01 | End: 2021-09-27 | Stop reason: SDUPTHER

## 2021-09-27 DIAGNOSIS — F90.0 ADHD (ATTENTION DEFICIT HYPERACTIVITY DISORDER), INATTENTIVE TYPE: Chronic | ICD-10-CM

## 2021-09-27 RX ORDER — LISDEXAMFETAMINE DIMESYLATE 60 MG/1
60 TABLET, CHEWABLE ORAL EVERY MORNING
Qty: 30 TABLET | Refills: 0 | Status: SHIPPED | OUTPATIENT
Start: 2021-09-27 | End: 2021-10-25 | Stop reason: SDUPTHER

## 2021-10-25 DIAGNOSIS — F90.0 ADHD (ATTENTION DEFICIT HYPERACTIVITY DISORDER), INATTENTIVE TYPE: Chronic | ICD-10-CM

## 2021-10-25 NOTE — TELEPHONE ENCOUNTER
Rx Refill Note  Requested Prescriptions     Pending Prescriptions Disp Refills   • Lisdexamfetamine Dimesylate (Vyvanse) 60 MG chewable tablet 30 tablet 0     Sig: Chew 1 tablet Every Morning      Last office visit with prescribing clinician: 7/29/2021      Next office visit with prescribing clinician: 12/7/2021   Office Visit with Patt Perry MD (07/29/2021)       Urine Drug Screen - Urine, Clean Catch (12/03/2020)      Paris Manzo MA  10/25/21, 10:47 EDT     Inspect printed

## 2021-10-26 RX ORDER — LISDEXAMFETAMINE DIMESYLATE 60 MG/1
60 TABLET, CHEWABLE ORAL EVERY MORNING
Qty: 30 TABLET | Refills: 0 | Status: SHIPPED | OUTPATIENT
Start: 2021-10-26 | End: 2021-11-19 | Stop reason: SDUPTHER

## 2021-11-19 DIAGNOSIS — F90.0 ADHD (ATTENTION DEFICIT HYPERACTIVITY DISORDER), INATTENTIVE TYPE: Chronic | ICD-10-CM

## 2021-11-19 RX ORDER — LISDEXAMFETAMINE DIMESYLATE 60 MG/1
60 TABLET, CHEWABLE ORAL EVERY MORNING
Qty: 30 TABLET | Refills: 0 | Status: SHIPPED | OUTPATIENT
Start: 2021-11-19 | End: 2021-12-21 | Stop reason: SDUPTHER

## 2021-11-19 NOTE — TELEPHONE ENCOUNTER
Rx Refill Note  Requested Prescriptions     Pending Prescriptions Disp Refills   • Lisdexamfetamine Dimesylate (Vyvanse) 60 MG chewable tablet 30 tablet 0     Sig: Chew 1 tablet Every Morning      Last office visit with prescribing clinician: 7/29/2021      Next office visit with prescribing clinician: 12/7/2021   Office Visit with Patt Perry MD (07/29/2021)           Meghan Dumont MA  11/19/21, 11:59 EST     INSPECT RAN

## 2021-12-07 ENCOUNTER — OFFICE VISIT (OUTPATIENT)
Dept: PSYCHIATRY | Facility: CLINIC | Age: 36
End: 2021-12-07

## 2021-12-07 DIAGNOSIS — F90.0 ATTENTION DEFICIT HYPERACTIVITY DISORDER (ADHD), PREDOMINANTLY INATTENTIVE TYPE: Primary | Chronic | ICD-10-CM

## 2021-12-07 PROCEDURE — 99213 OFFICE O/P EST LOW 20 MIN: CPT | Performed by: PSYCHIATRY & NEUROLOGY

## 2021-12-07 NOTE — PROGRESS NOTES
Subjective   Annalisa Fuentes is a 35 y.o. female who presents today for follow up     Chief Complaint:   decreased concentration     History of Present Illness:   Long history of ADHD,  Was  on different meds in the past, was stable on current meds     Today the pt reported feeling stable, on meds, no major changes,  feels more comfortable, less distractions , trying to stay busy   She is back to work in the office     Concentration is good on  vyvanse chewable last long enough , able to finish her tasks , with meds she is  able to stay productive to finish her work, not missing deadlines  .   Denied side effects, no anxiety      The pt denied feeling depressed/hopeless/helpless,    Denied AVH/SI/HI     concentration - good on meds, does not always use on the weekend   Sleep is good and restorative       Precipitating and Ameliorating Factors: job related stress  alleviating factors - to spend time with baby animals (suirrels)   Dogs         PAST PSYCHIATRIC HISTORY      Previous Psychiatric Diagnoses   Axis I: Attention Deficit Disorder     Past Hospitalizations or Residential Treatment   Locations\Providers: none      Past Outpatient Treatment   Diagnosis Treated: ADD  Location: PCP      Prior Psychiatric Medications   Comments: adderall - skin picking       Consequences of Mental Disorder   Consequences: emotional distress     SOCIAL HISTORY     Number of marriages: 1  Number of children: 0  Current Relationship is: supportive  Family of Origin is: abusive     Current Living Situation   Lives with: spouse     Education   Level: some college     Employment   Job Status: full-time     Hobbies and Leisure Activities   Activity Type: quiet activities     Alcohol use   Freq. drinkin drink  Smoking History   Smoking Hx: Never smoker     Exercise   Exercise sessions (per wk): 1     Illicit Drug Use   Illicit Drugs used: none     FAMILY HISTORY OF MENTAL DISORDERS   fh Grandparents: Non-contributory  fh Mother:  Non-contributory  fh Father: Addictive Disorders - Alcoholism  fh Siblings: Non-contributory  fh Other: Non-contributory     The following portions of the patient's history were reviewed and updated as appropriate: allergies, current medications, past family history, past medical history, past social history, past surgical history and problem list.            Interval History  No Change - stable     Side Effects  Denied       Past Medical History:  Past Medical History:   Diagnosis Date   • ADHD (attention deficit hyperactivity disorder)        Social History:  Social History     Socioeconomic History   • Marital status:    Tobacco Use   • Smoking status: Never Smoker   • Smokeless tobacco: Never Used   Vaping Use   • Vaping Use: Never used   Substance and Sexual Activity   • Alcohol use: Yes     Comment: socially   • Drug use: No   • Sexual activity: Defer       Family History:  History reviewed. No pertinent family history.    Past Surgical History:  History reviewed. No pertinent surgical history.    Problem List:  Patient Active Problem List   Diagnosis   • ADD (attention deficit disorder)   • Encounter for long-term (current) use of other medications       Allergy:   Allergies   Allergen Reactions   • Amoxicillin Nausea And Vomiting        Discontinued Medications:  There are no discontinued medications.    Current Medications:   Current Outpatient Medications   Medication Sig Dispense Refill   • Lisdexamfetamine Dimesylate (Vyvanse) 60 MG chewable tablet Chew 1 tablet Every Morning 30 tablet 0     No current facility-administered medications for this visit.         Review of Symptoms:    Psychiatric/Behavioral: Negative for agitation, behavioral problems, confusion, decreased concentration, dysphoric mood, hallucinations, self-injury, sleep disturbance and suicidal ideas.   The patient is not nervous/anxious and is not hyperactive.        Physical Exam:   There were no vitals taken for this  visit.    Mental Status Exam:   Hygiene:   good  Cooperation:  Cooperative  Eye Contact:  Good  Psychomotor Behavior:  Appropriate  Affect:  Appropriate  Mood: normal  Hopelessness: Denies  Speech:  Normal  Thought Process:  Goal directed and Linear  Thought Content:  Normal  Suicidal:  None  Homicidal:  None  Hallucinations:  None  Delusion:  None  Memory:  Intact  Orientation:  Person, Place, Time and Situation  Reliability:  good  Insight:  Good  Judgement:  Good  Impulse Control:  Good  Physical/Medical Issues:  No      MSE from 7/29/2021   was reviewed and no changes necessary     PHQ-9 Depression Screening  Little interest or pleasure in doing things? 0   Feeling down, depressed, or hopeless? 0   Trouble falling or staying asleep, or sleeping too much?     Feeling tired or having little energy?     Poor appetite or overeating?     Feeling bad about yourself - or that you are a failure or have let yourself or your family down?     Trouble concentrating on things, such as reading the newspaper or watching television?     Moving or speaking so slowly that other people could have noticed? Or the opposite - being so fidgety or restless that you have been moving around a lot more than usual?     Thoughts that you would be better off dead, or of hurting yourself in some way?     PHQ-9 Total Score 0   If you checked off any problems, how difficult have these problems made it for you to do your work, take care of things at home, or get along with other people?             Never smoker    I advised Annalisa of the risks of tobacco use.     Lab Results:   No visits with results within 3 Month(s) from this visit.   Latest known visit with results is:   No results found for any previous visit.       Assessment/Plan   Problems Addressed this Visit        Mental Health    ADD (attention deficit disorder) - Primary (Chronic)      Diagnoses       Codes Comments    Attention deficit hyperactivity disorder (ADHD), predominantly  inattentive type    -  Primary ICD-10-CM: F90.0  ICD-9-CM: 314.00           Visit Diagnoses:    ICD-10-CM ICD-9-CM   1. Attention deficit hyperactivity disorder (ADHD), predominantly inattentive type  F90.0 314.00       TREATMENT PLAN/GOALS: Continue supportive psychotherapy efforts and medications as indicated. Treatment and medication options discussed during today's visit. Patient ackowledged and verbally consented to continue with current treatment plan and was educated on the importance of compliance with treatment and follow-up appointments.    MEDICATION ISSUES:  1. ADHD inattentive type -  Cont vyvanse chewable  60 mg ,    long term stimulant use discussed , the pt is using for work , her job requires concentration  No changes necessary      INSPECT reviewed as expected - 11/24/2021  - vyvanse last refill      PHQ scored 0 and indicated no depression     Discussed medication options and treatment plan of prescribed medication as well as the risks, benefits, and side effects including potential falls, possible impaired driving and metabolic adversities among others. Patient is agreeable to call the office with any worsening of symptoms or onset of side effects. Patient is agreeable to call 911 or go to the nearest ER should he/she begin having SI/HI. No medication side effects or related complaints today.     MEDS ORDERED DURING VISIT:  No orders of the defined types were placed in this encounter.  will refill when it is due     Return in about 4 months (around 4/7/2022).          This document has been electronically signed by Patt Perry MD  December 7, 2021 15:59 EST

## 2021-12-07 NOTE — PATIENT INSTRUCTIONS
Attention Deficit Hyperactivity Disorder, Adult  Attention deficit hyperactivity disorder (ADHD) is a mental health disorder that starts during childhood (neurodevelopmental disorder). For many people with ADHD, the disorder continues into the adult years. Treatment can help you manage your symptoms.  What are the causes?  The exact cause of ADHD is not known. Most experts believe genetics and environmental factors contribute to ADHD.  What increases the risk?  The following factors may make you more likely to develop this condition:  · Having a family history of ADHD.  · Being male.  · Being born to a mother who smoked or drank alcohol during pregnancy.  · Being exposed to lead or other toxins in the womb or early in life.  · Being born before 37 weeks of pregnancy (prematurely) or at a low birth weight.  · Having experienced a brain injury.  What are the signs or symptoms?  Symptoms of this condition depend on the type of ADHD. The two main types are inattentive and hyperactive-impulsive. Some people may have symptoms of both types.  Symptoms of the inattentive type include:  · Difficulty paying attention.  · Making careless mistakes.  · Not following instructions.  · Being disorganized.  · Avoiding tasks that require time and attention.  · Losing and forgetting things.  · Being easily distracted.  Symptoms of the hyperactive-impulsive type include:  · Restlessness.  · Talking too much.  · Interrupting.  · Difficulty with:  ? Sitting still.  ? Feeling motivated.  ? Relaxing.  ? Waiting in line or waiting for a turn.  In adults, this condition may lead to certain problems, such as:  · Keeping jobs.  · Performing tasks at work.  · Having stable relationships.  · Being on time or keeping to a schedule.  How is this diagnosed?  This condition is diagnosed based on your current symptoms and your history of symptoms. The diagnosis can be made by a health care provider such as a primary care provider or a mental health  care specialist.  Your health care provider may use a symptom checklist or a behavior rating scale to evaluate your symptoms. He or she may also want to talk with people who have observed your behaviors throughout your life.  How is this treated?  This condition can be treated with medicines and behavior therapy. Medicines may be the best option to reduce impulsive behaviors and improve attention. Your health care provider may recommend:  · Stimulant medicines. These are the most common medicines used for adult ADHD. They affect certain chemicals in the brain (neurotransmitters) and improve your ability to control your symptoms.  · A non-stimulant medicine for adult ADHD (atomoxetine). This medicine increases a neurotransmitter called norepinephrine. It may take weeks to months to see effects from this medicine.  Counseling and behavioral management are also important for treating ADHD. Counseling is often used along with medicine. Your health care provider may suggest:  · Cognitive behavioral therapy (CBT). This type of therapy teaches you to replace negative thoughts and actions with positive thoughts and actions. When used as part of ADHD treatment, this therapy may also include:  ? Coping strategies for organization, time management, impulse control, and stress reduction.  ? Mindfulness and meditation training.  · Behavioral management. You may work with a  who is specially trained to help people with ADHD manage and organize activities and function more effectively.  Follow these instructions at home:  Medicines    · Take over-the-counter and prescription medicines only as told by your health care provider.  · Talk with your health care provider about the possible side effects of your medicines and how to manage them.    Lifestyle    · Do not use drugs.  · Do not drink alcohol if:  ? Your health care provider tells you not to drink.  ? You are pregnant, may be pregnant, or are planning to become  pregnant.  · If you drink alcohol:  ? Limit how much you use to:  § 0-1 drink a day for women.  § 0-2 drinks a day for men.  ? Be aware of how much alcohol is in your drink. In the [place] one drink equals one 12 oz bottle of beer (355 mL), one 5 oz glass of wine (148 mL), or one 1½ oz glass of hard liquor (44 mL).  · Get enough sleep.  · Eat a healthy diet.  · Exercise regularly. Exercise can help to reduce stress and anxiety.    General instructions  · Learn as much as you can about adult ADHD, and work closely with your health care providers to find the treatments that work best for you.  · Follow the same schedule each day.  · Use reminder devices like notes, calendars, and phone apps to stay on time and organized.  · Keep all follow-up visits as told by your health care provider and therapist. This is important.  Where to find more information  A health care provider may be able to recommend resources that are available online or over the phone. You could start with:  · Attention Deficit Disorder Association (ADDA): www.add.org  · National [place] of Mental Health (NIM): www.nimh.nih.gov  Contact a health care provider if:  · Your symptoms continue to cause problems.  · You have side effects from your medicine, such as:  ? Repeated muscle twitches, coughing, or speech outbursts.  ? Sleep problems.  ? Loss of appetite.  ? Dizziness.  ? Unusually fast heartbeat.  ? Stomach pains.  ? Headaches.  · You are struggling with anxiety, depression, or substance abuse.  Get help right away if you:  · Have a severe reaction to a medicine.  If you ever feel like you may hurt yourself or others, or have thoughts about taking your own life, get help right away. You can go to the nearest emergency department or call:  · Your local emergency services (911 in the [place]  · A suicide crisis helpline, such as the National Suicide Prevention Lifeline at 1-773.852.7827. This is open 24 hours a day.  Summary  · ADHD is a mental  health disorder that starts during childhood (neurodevelopmental disorder) and often continues into the adult years.  · The exact cause of ADHD is not known. Most experts believe genetics and environmental factors contribute to ADHD.  · There is no cure for ADHD, but treatment with medicine, cognitive behavioral therapy, or behavioral management can help you manage your condition.  This information is not intended to replace advice given to you by your health care provider. Make sure you discuss any questions you have with your health care provider.  Document Revised: 05/11/2020 Document Reviewed: 05/11/2020  [employer] Patient Education © 2021 [employer]

## 2021-12-21 DIAGNOSIS — F90.0 ADHD (ATTENTION DEFICIT HYPERACTIVITY DISORDER), INATTENTIVE TYPE: Chronic | ICD-10-CM

## 2021-12-21 RX ORDER — LISDEXAMFETAMINE DIMESYLATE 60 MG/1
60 TABLET, CHEWABLE ORAL EVERY MORNING
Qty: 30 TABLET | Refills: 0 | Status: SHIPPED | OUTPATIENT
Start: 2021-12-21 | End: 2022-01-20 | Stop reason: SDUPTHER

## 2021-12-21 NOTE — TELEPHONE ENCOUNTER
Rx Refill Note  Requested Prescriptions     Pending Prescriptions Disp Refills   • Lisdexamfetamine Dimesylate (Vyvanse) 60 MG chewable tablet 30 tablet 0     Sig: Chew 1 tablet Every Morning      Last office visit with prescribing clinician: 12/7/2021      Next office visit with prescribing clinician: 4/12/2022   Office Visit with Patt Perry MD (12/07/2021)       Urine Drug Screen - Urine, Clean Catch (12/03/2020)      Paris Manzo MA  12/21/21, 15:16 EST     Inspect printed

## 2022-01-20 DIAGNOSIS — F90.0 ADHD (ATTENTION DEFICIT HYPERACTIVITY DISORDER), INATTENTIVE TYPE: Chronic | ICD-10-CM

## 2022-01-20 RX ORDER — LISDEXAMFETAMINE DIMESYLATE 60 MG/1
60 TABLET, CHEWABLE ORAL EVERY MORNING
Qty: 30 TABLET | Refills: 0 | Status: SHIPPED | OUTPATIENT
Start: 2022-01-20 | End: 2022-02-16 | Stop reason: SDUPTHER

## 2022-01-20 NOTE — TELEPHONE ENCOUNTER
Rx Refill Note  Requested Prescriptions      No prescriptions requested or ordered in this encounter      Last office visit with prescribing clinician: 12/7/2021      Next office visit with prescribing clinician: 4/12/2022   Office Visit with Patt Perry MD (12/07/2021)       Urine Drug Screen - Urine, Clean Catch (12/03/2020)      Meghan Dumont MA  01/20/22, 10:41 EST       INSPECT RAN

## 2022-02-16 DIAGNOSIS — F90.0 ADHD (ATTENTION DEFICIT HYPERACTIVITY DISORDER), INATTENTIVE TYPE: Chronic | ICD-10-CM

## 2022-02-16 NOTE — TELEPHONE ENCOUNTER
Rx Refill Note  Requested Prescriptions     Pending Prescriptions Disp Refills   • Lisdexamfetamine Dimesylate (Vyvanse) 60 MG chewable tablet 30 tablet 0     Sig: Chew 1 tablet Every Morning      Last office visit with prescribing clinician: 12/7/2021      Next office visit with prescribing clinician: 4/12/2022   Office Visit with Patt Perry MD (12/07/2021)       Urine Drug Screen - Urine, Clean Catch (12/03/2020)      Paris Manzo MA  02/16/22, 16:20 EST     Inspect printed

## 2022-02-17 RX ORDER — LISDEXAMFETAMINE DIMESYLATE 60 MG/1
60 TABLET, CHEWABLE ORAL EVERY MORNING
Qty: 30 TABLET | Refills: 0 | Status: SHIPPED | OUTPATIENT
Start: 2022-02-17 | End: 2022-03-21 | Stop reason: SDUPTHER

## 2022-03-21 DIAGNOSIS — F90.0 ADHD (ATTENTION DEFICIT HYPERACTIVITY DISORDER), INATTENTIVE TYPE: Chronic | ICD-10-CM

## 2022-03-21 NOTE — TELEPHONE ENCOUNTER
Rx Refill Note  Requested Prescriptions     Pending Prescriptions Disp Refills   • Lisdexamfetamine Dimesylate (Vyvanse) 60 MG chewable tablet 30 tablet 0     Sig: Chew 1 tablet Every Morning      Last office visit with prescribing clinician: 12/7/2021      Next office visit with prescribing clinician: 4/12/2022   Office Visit with Patt Perry MD (12/07/2021)       Urine Drug Screen - Urine, Clean Catch (12/03/2020)      Paris Manzo MA  03/21/22, 15:23 EDT     Inspect printed

## 2022-03-22 RX ORDER — LISDEXAMFETAMINE DIMESYLATE 60 MG/1
60 TABLET, CHEWABLE ORAL EVERY MORNING
Qty: 30 TABLET | Refills: 0 | Status: SHIPPED | OUTPATIENT
Start: 2022-03-22 | End: 2022-04-21 | Stop reason: SDUPTHER

## 2022-04-12 ENCOUNTER — OFFICE VISIT (OUTPATIENT)
Dept: PSYCHIATRY | Facility: CLINIC | Age: 37
End: 2022-04-12

## 2022-04-12 DIAGNOSIS — F90.0 ADHD, PREDOMINANTLY INATTENTIVE TYPE: Primary | Chronic | ICD-10-CM

## 2022-04-12 PROCEDURE — 99213 OFFICE O/P EST LOW 20 MIN: CPT | Performed by: PSYCHIATRY & NEUROLOGY

## 2022-04-12 NOTE — PROGRESS NOTES
Subjective   Annalisa Fuentes is a 36 y.o. female who presents today for follow up     Chief Complaint:   decreased concentration , to refill meds to maintain stability      History of Present Illness:   Long history of ADHD,  Was  on different meds in the past, was stable on current meds     Today the pt reported no major problems, she is feeling stable, on meds,  less distractions , trying to stay busy , her job requires a lot of concentration   Concentration is good on  vyvanse chewable last long enough , able to finish her tasks , with meds she is  able to stay productive to finish her work, not missing deadlines  .        The pt denied feeling depressed/hopeless/helpless,    Denied AVH/SI/HI   No irritability , no manic episodes     concentration - good on meds, does not always use on the weekend   Sleep is good and restorative       Precipitating and Ameliorating Factors: job related stress  alleviating factors - to spend time with baby animals (suirrels)   Dogs         PAST PSYCHIATRIC HISTORY      Previous Psychiatric Diagnoses   Axis I: Attention Deficit Disorder     Past Hospitalizations or Residential Treatment   Locations\Providers: none      Past Outpatient Treatment   Diagnosis Treated: ADD  Location: PCP      Prior Psychiatric Medications   Comments: adderall - skin picking       Consequences of Mental Disorder   Consequences: emotional distress     SOCIAL HISTORY     Number of marriages: 1  Number of children: 0  Current Relationship is: supportive  Family of Origin is: abusive     Current Living Situation   Lives with: spouse     Education   Level: some college     Employment   Job Status: full-time     Hobbies and Leisure Activities   Activity Type: quiet activities     Alcohol use   Freq. drinkin drink  Smoking History   Smoking Hx: Never smoker     Exercise   Exercise sessions (per wk): 1     Illicit Drug Use   Illicit Drugs used: none     FAMILY HISTORY OF MENTAL DISORDERS   fh  Grandparents: Non-contributory  fh Mother: Non-contributory  fh Father: Addictive Disorders - Alcoholism  fh Siblings: Non-contributory  fh Other: Non-contributory     The following portions of the patient's history were reviewed and updated as appropriate: allergies, current medications, past family history, past medical history, past social history, past surgical history and problem list.            Interval History  No Change - stable     Side Effects  Denied       Past Medical History:  Past Medical History:   Diagnosis Date   • ADHD (attention deficit hyperactivity disorder)        Social History:  Social History     Socioeconomic History   • Marital status:    Tobacco Use   • Smoking status: Never Smoker   • Smokeless tobacco: Never Used   Vaping Use   • Vaping Use: Never used   Substance and Sexual Activity   • Alcohol use: Yes     Comment: socially   • Drug use: No   • Sexual activity: Defer       Family History:  History reviewed. No pertinent family history.    Past Surgical History:  History reviewed. No pertinent surgical history.    Problem List:  Patient Active Problem List   Diagnosis   • ADHD, predominantly inattentive type   • Encounter for long-term (current) use of other medications       Allergy:   Allergies   Allergen Reactions   • Amoxicillin Nausea And Vomiting        Discontinued Medications:  There are no discontinued medications.    Current Medications:   Current Outpatient Medications   Medication Sig Dispense Refill   • Lisdexamfetamine Dimesylate (Vyvanse) 60 MG chewable tablet Chew 1 tablet Every Morning 30 tablet 0     No current facility-administered medications for this visit.         Review of Symptoms:    Psychiatric/Behavioral: Negative for agitation, behavioral problems, confusion, decreased concentration, dysphoric mood, hallucinations, self-injury, sleep disturbance and suicidal ideas.   The patient is not nervous/anxious and is not hyperactive.        Physical Exam:    There were no vitals taken for this visit.    Mental Status Exam:   Hygiene:   good  Cooperation:  Cooperative  Eye Contact:  Good  Psychomotor Behavior:  Appropriate  Affect:  Appropriate  Mood: normal  Hopelessness: Denies  Speech:  Normal  Thought Process:  Goal directed and Linear  Thought Content:  Normal  Suicidal:  None  Homicidal:  None  Hallucinations:  None  Delusion:  None  Memory:  Intact  Orientation:  Person, Place, Time and Situation  Reliability:  good  Insight:  Good  Judgement:  Good  Impulse Control:  Good  Physical/Medical Issues:  No      MSE from 12/7/2021   was reviewed and no changes necessary     PHQ-9 Depression Screening  Little interest or pleasure in doing things?     Feeling down, depressed, or hopeless?     Trouble falling or staying asleep, or sleeping too much?     Feeling tired or having little energy?     Poor appetite or overeating?     Feeling bad about yourself - or that you are a failure or have let yourself or your family down?     Trouble concentrating on things, such as reading the newspaper or watching television?     Moving or speaking so slowly that other people could have noticed? Or the opposite - being so fidgety or restless that you have been moving around a lot more than usual?     Thoughts that you would be better off dead, or of hurting yourself in some way?     PHQ-9 Total Score  0   If you checked off any problems, how difficult have these problems made it for you to do your work, take care of things at home, or get along with other people?             Never smoker    I advised Annalisa of the risks of tobacco use.     Lab Results:   No visits with results within 3 Month(s) from this visit.   Latest known visit with results is:   No results found for any previous visit.       Assessment/Plan   Problems Addressed this Visit        Mental Health    ADHD, predominantly inattentive type - Primary (Chronic)      Diagnoses       Codes Comments    ADHD, predominantly  inattentive type    -  Primary ICD-10-CM: F90.0  ICD-9-CM: 314.00           Visit Diagnoses:    ICD-10-CM ICD-9-CM   1. ADHD, predominantly inattentive type  F90.0 314.00       TREATMENT PLAN/GOALS: Continue supportive psychotherapy efforts and medications as indicated. Treatment and medication options discussed during today's visit. Patient ackowledged and verbally consented to continue with current treatment plan and was educated on the importance of compliance with treatment and follow-up appointments.    MEDICATION ISSUES:  1. ADHD inattentive type -  Cont vyvanse chewable  60 mg ,    long term stimulant use discussed , the pt is using for work , her job requires concentration  No changes necessary , complaint with tx and labs      INSPECT reviewed as expected - 3/22/22  - vyvanse last refill    Will fill when it is due   PHQ scored 0 and indicated no depression     Discussed medication options and treatment plan of prescribed medication as well as the risks, benefits, and side effects including potential falls, possible impaired driving and metabolic adversities among others. Patient is agreeable to call the office with any worsening of symptoms or onset of side effects. Patient is agreeable to call 911 or go to the nearest ER should he/she begin having SI/HI. No medication side effects or related complaints today.     MEDS ORDERED DURING VISIT:  No orders of the defined types were placed in this encounter.  will refill when it is due     Return in about 4 months (around 8/12/2022).          This document has been electronically signed by Patt Perry MD  April 12, 2022 15:59 EDT

## 2022-04-21 DIAGNOSIS — F90.0 ADHD (ATTENTION DEFICIT HYPERACTIVITY DISORDER), INATTENTIVE TYPE: Chronic | ICD-10-CM

## 2022-04-21 RX ORDER — LISDEXAMFETAMINE DIMESYLATE 60 MG/1
60 TABLET, CHEWABLE ORAL EVERY MORNING
Qty: 30 TABLET | Refills: 0 | Status: SHIPPED | OUTPATIENT
Start: 2022-04-21 | End: 2022-05-16 | Stop reason: SDUPTHER

## 2022-04-21 NOTE — TELEPHONE ENCOUNTER
Rx Refill Note  Requested Prescriptions     Pending Prescriptions Disp Refills   • Lisdexamfetamine Dimesylate (Vyvanse) 60 MG chewable tablet 30 tablet 0     Sig: Chew 1 tablet Every Morning      Last office visit with prescribing clinician: 4/12/2022      Next office visit with prescribing clinician: 8/16/2022   Office Visit with Patt Perry MD (04/12/2022)       Urine Drug Screen - Urine, Clean Catch (12/03/2020)      Paris Manzo MA  04/21/22, 14:39 EDT     Inspect printed

## 2022-05-16 DIAGNOSIS — F90.0 ADHD (ATTENTION DEFICIT HYPERACTIVITY DISORDER), INATTENTIVE TYPE: Chronic | ICD-10-CM

## 2022-05-16 NOTE — TELEPHONE ENCOUNTER
Rx Refill Note  Requested Prescriptions     Pending Prescriptions Disp Refills   • Lisdexamfetamine Dimesylate (Vyvanse) 60 MG chewable tablet 30 tablet 0     Sig: Chew 1 tablet Every Morning      Last office visit with prescribing clinician: 4/12/2022      Next office visit with prescribing clinician: 8/16/2022   Office Visit with Patt Perry MD (04/12/2022)       Urine Drug Screen - Urine, Clean Catch (12/03/2020)      Paris Manzo MA  05/16/22, 14:05 EDT     Inspect printed

## 2022-05-17 RX ORDER — LISDEXAMFETAMINE DIMESYLATE 60 MG/1
60 TABLET, CHEWABLE ORAL EVERY MORNING
Qty: 30 TABLET | Refills: 0 | Status: SHIPPED | OUTPATIENT
Start: 2022-05-17 | End: 2022-06-17 | Stop reason: SDUPTHER

## 2022-06-17 DIAGNOSIS — F90.0 ADHD (ATTENTION DEFICIT HYPERACTIVITY DISORDER), INATTENTIVE TYPE: Chronic | ICD-10-CM

## 2022-06-17 NOTE — TELEPHONE ENCOUNTER
Rx Refill Note  Requested Prescriptions     Pending Prescriptions Disp Refills   • Lisdexamfetamine Dimesylate (Vyvanse) 60 MG chewable tablet 30 tablet 0     Sig: Chew 1 tablet Every Morning      Last office visit with prescribing clinician: 4/12/2022      Next office visit with prescribing clinician: 8/16/2022   Office Visit with Patt Perry MD (04/12/2022)       Urine Drug Screen - Urine, Clean Catch (12/03/2020)      Paris Manzo MA  06/17/22, 14:25 EDT

## 2022-06-22 RX ORDER — LISDEXAMFETAMINE DIMESYLATE 60 MG/1
60 TABLET, CHEWABLE ORAL EVERY MORNING
Qty: 30 TABLET | Refills: 0 | Status: SHIPPED | OUTPATIENT
Start: 2022-06-22 | End: 2022-07-19 | Stop reason: SDUPTHER

## 2022-07-19 DIAGNOSIS — F90.0 ADHD (ATTENTION DEFICIT HYPERACTIVITY DISORDER), INATTENTIVE TYPE: Chronic | ICD-10-CM

## 2022-07-19 NOTE — TELEPHONE ENCOUNTER
Rx Refill Note  Requested Prescriptions     Pending Prescriptions Disp Refills   • Lisdexamfetamine Dimesylate (Vyvanse) 60 MG chewable tablet 30 tablet 0     Sig: Chew 1 tablet Every Morning      Last office visit with prescribing clinician: 4/12/2022      Next office visit with prescribing clinician: 8/12/2022   Office Visit with Patt Perry MD (04/12/2022)       Urine Drug Screen - Urine, Clean Catch (12/03/2020)      Paris Manzo MA  07/19/22, 15:43 EDT     Inspect printed

## 2022-07-20 RX ORDER — LISDEXAMFETAMINE DIMESYLATE 60 MG/1
60 TABLET, CHEWABLE ORAL EVERY MORNING
Qty: 30 TABLET | Refills: 0 | Status: SHIPPED | OUTPATIENT
Start: 2022-07-20 | End: 2022-08-12 | Stop reason: SDUPTHER

## 2022-08-12 ENCOUNTER — OFFICE VISIT (OUTPATIENT)
Dept: PSYCHIATRY | Facility: CLINIC | Age: 37
End: 2022-08-12

## 2022-08-12 DIAGNOSIS — F90.0 ADHD (ATTENTION DEFICIT HYPERACTIVITY DISORDER), INATTENTIVE TYPE: Primary | Chronic | ICD-10-CM

## 2022-08-12 DIAGNOSIS — Z79.899 ENCOUNTER FOR LONG-TERM (CURRENT) USE OF OTHER MEDICATIONS: ICD-10-CM

## 2022-08-12 PROCEDURE — 99213 OFFICE O/P EST LOW 20 MIN: CPT | Performed by: PSYCHIATRY & NEUROLOGY

## 2022-08-12 RX ORDER — LISDEXAMFETAMINE DIMESYLATE 60 MG/1
60 TABLET, CHEWABLE ORAL EVERY MORNING
Qty: 30 TABLET | Refills: 0 | Status: SHIPPED | OUTPATIENT
Start: 2022-08-12 | End: 2022-09-14 | Stop reason: SDUPTHER

## 2022-08-12 NOTE — PROGRESS NOTES
Subjective   Annalisa Fuentes is a 36 y.o. female who presents today for follow up     Chief Complaint:   To f/u on decreased concentration , to refill meds to maintain stability      History of Present Illness:     Long history of ADHD,  Was  on different meds in the past, was stable on current meds     Today the pt reported no major problems, she is feeling stable, on meds,  less distractions , trying to stay busy , her job requires a lot of concentration   Concentration is good on  vyvanse chewable last long enough , able to finish her tasks , with meds she is  able to stay productive to finish her work, not missing deadlines  .        The pt denied feeling depressed/hopeless/helpless,    Denied AVH/SI/HI   No irritability , no manic episodes     concentration - good on meds, does not always use on the weekend   Sleep is good and restorative       Precipitating and Ameliorating Factors: job related stress  alleviating factors - to spend time with baby animals (suirrels)   Dogs         PAST PSYCHIATRIC HISTORY      Previous Psychiatric Diagnoses   Axis I: Attention Deficit Disorder     Past Hospitalizations or Residential Treatment   Locations\Providers: none      Past Outpatient Treatment   Diagnosis Treated: ADD  Location: PCP      Prior Psychiatric Medications   Comments: adderall - skin picking       Consequences of Mental Disorder   Consequences: emotional distress     SOCIAL HISTORY     Number of marriages: 1  Number of children: 0  Current Relationship is: supportive  Family of Origin is: abusive     Current Living Situation   Lives with: spouse     Education   Level: some college     Employment   Job Status: full-time     Hobbies and Leisure Activities   Activity Type: quiet activities     Alcohol use   Freq. drinkin drink  Smoking History   Smoking Hx: Never smoker     Exercise   Exercise sessions (per wk): 1     Illicit Drug Use   Illicit Drugs used: none     FAMILY HISTORY OF MENTAL DISORDERS    fh Grandparents: Non-contributory  fh Mother: Non-contributory  fh Father: Addictive Disorders - Alcoholism  fh Siblings: Non-contributory  fh Other: Non-contributory     The following portions of the patient's history were reviewed and updated as appropriate: allergies, current medications, past family history, past medical history, past social history, past surgical history and problem list.            Interval History  No Change - stable     Side Effects  Denied       Past Medical History:  Past Medical History:   Diagnosis Date   • ADHD (attention deficit hyperactivity disorder)        Social History:  Social History     Socioeconomic History   • Marital status:    Tobacco Use   • Smoking status: Never Smoker   • Smokeless tobacco: Never Used   Vaping Use   • Vaping Use: Never used   Substance and Sexual Activity   • Alcohol use: Yes     Comment: socially   • Drug use: No   • Sexual activity: Defer       Family History:  History reviewed. No pertinent family history.    Past Surgical History:  History reviewed. No pertinent surgical history.    Problem List:  Patient Active Problem List   Diagnosis   • ADHD, predominantly inattentive type   • Encounter for long-term (current) use of other medications       Allergy:   Allergies   Allergen Reactions   • Amoxicillin Nausea And Vomiting        Discontinued Medications:  Medications Discontinued During This Encounter   Medication Reason   • Lisdexamfetamine Dimesylate (Vyvanse) 60 MG chewable tablet Reorder       Current Medications:   Current Outpatient Medications   Medication Sig Dispense Refill   • Lisdexamfetamine Dimesylate (Vyvanse) 60 MG chewable tablet Chew 1 tablet Every Morning 30 tablet 0     No current facility-administered medications for this visit.         Review of Symptoms:    Psychiatric/Behavioral: Negative for agitation, behavioral problems, confusion, decreased concentration, dysphoric mood, hallucinations, self-injury, sleep  disturbance and suicidal ideas.   The patient is not nervous/anxious and is not hyperactive.        Physical Exam:   There were no vitals taken for this visit.    Mental Status Exam:   Hygiene:   good  Cooperation:  Cooperative  Eye Contact:  Good  Psychomotor Behavior:  Appropriate  Affect:  Appropriate  Mood: normal  Hopelessness: Denies  Speech:  Normal  Thought Process:  Goal directed and Linear  Thought Content:  Normal  Suicidal:  None  Homicidal:  None  Hallucinations:  None  Delusion:  None  Memory:  Intact  Orientation:  Person, Place, Time and Situation  Reliability:  good  Insight:  Good  Judgement:  Good  Impulse Control:  Good  Physical/Medical Issues:  No      MSE from 4/12/2022    was reviewed and no changes necessary     PHQ-9 Depression Screening  Little interest or pleasure in doing things?     Feeling down, depressed, or hopeless?     Trouble falling or staying asleep, or sleeping too much?     Feeling tired or having little energy?     Poor appetite or overeating?     Feeling bad about yourself - or that you are a failure or have let yourself or your family down?     Trouble concentrating on things, such as reading the newspaper or watching television?     Moving or speaking so slowly that other people could have noticed? Or the opposite - being so fidgety or restless that you have been moving around a lot more than usual?     Thoughts that you would be better off dead, or of hurting yourself in some way?     PHQ-9 Total Score  0   If you checked off any problems, how difficult have these problems made it for you to do your work, take care of things at home, or get along with other people?             Never smoker    I advised Annalisa of the risks of tobacco use.     Lab Results:   No visits with results within 3 Month(s) from this visit.   Latest known visit with results is:   No results found for any previous visit.       Assessment & Plan   Problems Addressed this Visit        Health Encounters     Encounter for long-term (current) use of other medications    Relevant Orders    [facility] Urine Drug Screen -      Other Visit Diagnoses     ADHD (attention deficit hyperactivity disorder), inattentive type  (Chronic)   -  Primary    Relevant Medications    Lisdexamfetamine Dimesylate (Vyvanse) 60 MG chewable tablet    Other Relevant Orders    [facility] Urine Drug Screen -      Diagnoses       Codes Comments    ADHD (attention deficit hyperactivity disorder), inattentive type    -  Primary ICD-10-CM: F90.0  ICD-9-CM: 314.00     Encounter for long-term (current) use of other medications     ICD-10-CM: Z79.899  ICD-9-CM: V58.69           Visit Diagnoses:    ICD-10-CM ICD-9-CM   1. ADHD (attention deficit hyperactivity disorder), inattentive type  F90.0 314.00   2. Encounter for long-term (current) use of other medications  Z79.899 V58.69       TREATMENT PLAN/GOALS: Continue supportive psychotherapy efforts and medications as indicated. Treatment and medication options discussed during today's visit. Patient ackowledged and verbally consented to continue with current treatment plan and was educated on the importance of compliance with treatment and follow-up appointments.    MEDICATION ISSUES:  1. ADHD inattentive type -  Cont vyvanse chewable  60 mg ,    long term stimulant use discussed , the pt is using for work , her job requires concentration  No changes necessary ,stable,  complaint with tx and labs      2 long term therapeutic drug monitoring - 12/2/2022 consistent,will repeat today      INSPECT reviewed as expected - 7/20/22  - vyvanse last refill       PHQ scored 0 and indicated no depression   MANPREET 7 scored 2     Discussed medication options and treatment plan of prescribed medication as well as the risks, benefits, and side effects including potential falls, possible impaired driving and metabolic adversities among others. Patient is agreeable to call the office with any worsening of symptoms or onset of side  effects. Patient is agreeable to call 911 or go to the nearest ER should he/she begin having SI/HI. No medication side effects or related complaints today.     MEDS ORDERED DURING VISIT:  New Medications Ordered This Visit   Medications   • Lisdexamfetamine Dimesylate (Vyvanse) 60 MG chewable tablet     Sig: Chew 1 tablet Every Morning     Dispense:  30 tablet     Refill:  0     Please dispense on or after August 17, 2022   will refill when it is due     Return in about 4 months (around 12/12/2022).          This document has been electronically signed by Patt Perry MD  August 12, 2022 14:36 EDT

## 2022-09-14 DIAGNOSIS — F90.0 ADHD (ATTENTION DEFICIT HYPERACTIVITY DISORDER), INATTENTIVE TYPE: Chronic | ICD-10-CM

## 2022-09-14 RX ORDER — LISDEXAMFETAMINE DIMESYLATE 60 MG/1
60 TABLET, CHEWABLE ORAL EVERY MORNING
Qty: 30 TABLET | Refills: 0 | Status: SHIPPED | OUTPATIENT
Start: 2022-09-14 | End: 2022-10-10 | Stop reason: SDUPTHER

## 2022-09-14 NOTE — TELEPHONE ENCOUNTER
Rx Refill Note  Requested Prescriptions     Pending Prescriptions Disp Refills   • Lisdexamfetamine Dimesylate (Vyvanse) 60 MG chewable tablet 30 tablet 0     Sig: Chew 1 tablet Every Morning      Last office visit with prescribing clinician: 8/12/2022      Next office visit with prescribing clinician: 12/27/2022   Office Visit with Patt Perry MD (08/12/2022)       [facility] Urine Drug Screen - (08/12/2022)      Paris Manzo MA  09/14/22, 14:39 EDT     Inspect printed; last fill 8-17-22

## 2022-10-10 DIAGNOSIS — F90.0 ADHD (ATTENTION DEFICIT HYPERACTIVITY DISORDER), INATTENTIVE TYPE: Chronic | ICD-10-CM

## 2022-10-10 NOTE — TELEPHONE ENCOUNTER
Rx Refill Note  Requested Prescriptions     Pending Prescriptions Disp Refills   • Lisdexamfetamine Dimesylate (Vyvanse) 60 MG chewable tablet 30 tablet 0     Sig: Chew 1 tablet Every Morning      Last office visit with prescribing clinician: 8/12/2022      Next office visit with prescribing clinician: 12/27/2022   Office Visit with Patt Perry MD (08/12/2022)       [facility] Urine Drug Screen - (08/12/2022)      Paris Manzo MA  10/10/22, 15:05 EDT     Inspect printed; last fill 9-14-22

## 2022-10-11 RX ORDER — LISDEXAMFETAMINE DIMESYLATE 60 MG/1
60 TABLET, CHEWABLE ORAL EVERY MORNING
Qty: 30 TABLET | Refills: 0 | Status: SHIPPED | OUTPATIENT
Start: 2022-10-11 | End: 2022-11-08 | Stop reason: SDUPTHER

## 2022-11-08 DIAGNOSIS — F90.0 ADHD (ATTENTION DEFICIT HYPERACTIVITY DISORDER), INATTENTIVE TYPE: Chronic | ICD-10-CM

## 2022-11-08 NOTE — TELEPHONE ENCOUNTER
Rx Refill Note  Requested Prescriptions     Pending Prescriptions Disp Refills   • Lisdexamfetamine Dimesylate (Vyvanse) 60 MG chewable tablet 30 tablet 0     Sig: Chew 1 tablet Every Morning      Last office visit with prescribing clinician: 8/12/2022      Next office visit with prescribing clinician: 12/27/2022   Office Visit with Patt Perry MD (08/12/2022)       [facility] Urine Drug Screen - (08/12/2022)      Paris Manzo MA  11/08/22, 14:00 EST     Inspect printed; last fill 10-12

## 2022-11-09 RX ORDER — LISDEXAMFETAMINE DIMESYLATE 60 MG/1
60 TABLET, CHEWABLE ORAL EVERY MORNING
Qty: 30 TABLET | Refills: 0 | Status: SHIPPED | OUTPATIENT
Start: 2022-11-09 | End: 2022-12-12 | Stop reason: SDUPTHER

## 2022-12-12 DIAGNOSIS — F90.0 ADHD (ATTENTION DEFICIT HYPERACTIVITY DISORDER), INATTENTIVE TYPE: Chronic | ICD-10-CM

## 2022-12-12 NOTE — TELEPHONE ENCOUNTER
Rx Refill Note  Requested Prescriptions     Pending Prescriptions Disp Refills   • Lisdexamfetamine Dimesylate (Vyvanse) 60 MG chewable tablet 30 tablet 0     Sig: Chew 1 tablet Every Morning      Last office visit with prescribing clinician: 8/12/2022   Last telemedicine visit with prescribing clinician: 12/27/2022   Next office visit with prescribing clinician: 12/27/2022   Office Visit with Patt Perry MD (08/12/2022)       [facility] Urine Drug Screen - (08/12/2022)                   Would you like a call back once the refill request has been completed: [] Yes [] No    If the office needs to give you a call back, can they leave a voicemail: [] Yes [] No    Paris Manzo MA  12/12/22, 15:11 EST     Last fill 11-11; inspect printed

## 2022-12-13 RX ORDER — LISDEXAMFETAMINE DIMESYLATE 60 MG/1
60 TABLET, CHEWABLE ORAL EVERY MORNING
Qty: 30 TABLET | Refills: 0 | Status: SHIPPED | OUTPATIENT
Start: 2022-12-13 | End: 2022-12-27 | Stop reason: SDUPTHER

## 2022-12-27 ENCOUNTER — TELEMEDICINE (OUTPATIENT)
Dept: PSYCHIATRY | Facility: CLINIC | Age: 37
End: 2022-12-27

## 2022-12-27 DIAGNOSIS — F90.0 ADHD, PREDOMINANTLY INATTENTIVE TYPE: Primary | Chronic | ICD-10-CM

## 2022-12-27 DIAGNOSIS — F90.0 ADHD (ATTENTION DEFICIT HYPERACTIVITY DISORDER), INATTENTIVE TYPE: Chronic | ICD-10-CM

## 2022-12-27 PROCEDURE — 99213 OFFICE O/P EST LOW 20 MIN: CPT | Performed by: PSYCHIATRY & NEUROLOGY

## 2022-12-27 RX ORDER — LISDEXAMFETAMINE DIMESYLATE 60 MG/1
60 TABLET, CHEWABLE ORAL EVERY MORNING
Qty: 30 TABLET | Refills: 0 | Status: SHIPPED | OUTPATIENT
Start: 2022-12-27 | End: 2023-02-07 | Stop reason: SDUPTHER

## 2022-12-27 NOTE — PROGRESS NOTES
Subjective   Annalisa Fuentes is a 37 y.o. female who presents today for follow up   Via my chart video  This provider is located at [facility], [address] [place] [address] [place],using a secure [employer] Video Visit through [employer]. Patient is being seen remotely via telehealth at their home address in [place] , and stated they are in a secure environment for this session. The patient's condition being diagnosed/treated is appropriate for telemedicine. The provider identified herself as well as her credentials. The patient, and/or patients guardian, consent to be seen remotely, and when consent is given they understand that the consent allows for patient identifiable information to be sent to a third party as needed. They may refuse to be seen remotely at any time. The electronic data is encrypted and password protected, and the patient and/or guardian has been advised of the potential risks to privacy not withstanding such measures.    Chief Complaint:   To f/u on decreased concentration , to refill meds to maintain stability      History of Present Illness:     Long history of ADHD,  Was  on different meds in the past, was stable on current meds     Today the pt reported no major problems, denied feeling depressed/hopeless/helpless  Denied AVH/SI/HI  Concentration is good on meds   less distractions , able to finish the project on time,  trying to stay busy , her job requires a lot of concentration     Concentration is good on  vyvanse chewable last long enough       No irritability , no manic episodes     concentration - good on meds, does not always use on the weekend   Sleep is good and restorative       Precipitating and Ameliorating Factors: job related stress  alleviating factors - to spend time with baby animals (suirrels)   Dogs         PAST PSYCHIATRIC HISTORY      Previous Psychiatric Diagnoses   Axis I: Attention Deficit Disorder     Past Hospitalizations or Residential  Treatment   Locations\Providers: none      Past Outpatient Treatment   Diagnosis Treated: ADD  Location: PCP      Prior Psychiatric Medications   Comments: adderall - skin picking       Consequences of Mental Disorder   Consequences: emotional distress     SOCIAL HISTORY     Number of marriages: 1  Number of children: 0  Current Relationship is: supportive  Family of Origin is: abusive     Current Living Situation   Lives with: spouse     Education   Level: some college     Employment   Job Status: full-time     Hobbies and Leisure Activities   Activity Type: quiet activities     Alcohol use   Freq. drinkin drink  Smoking History   Smoking Hx: Never smoker     Exercise   Exercise sessions (per wk): 1     Illicit Drug Use   Illicit Drugs used: none     FAMILY HISTORY OF MENTAL DISORDERS   fh Grandparents: Non-contributory  fh Mother: Non-contributory  fh Father: Addictive Disorders - Alcoholism  fh Siblings: Non-contributory  fh Other: Non-contributory     The following portions of the patient's history were reviewed and updated as appropriate: allergies, current medications, past family history, past medical history, past social history, past surgical history and problem list.            Interval History  No Change - stable     Side Effects  Denied       Past Medical History:  Past Medical History:   Diagnosis Date   • ADHD (attention deficit hyperactivity disorder)        Social History:  Social History     Socioeconomic History   • Marital status:    Tobacco Use   • Smoking status: Never   • Smokeless tobacco: Never   Vaping Use   • Vaping Use: Never used   Substance and Sexual Activity   • Alcohol use: Yes     Comment: socially   • Drug use: No   • Sexual activity: Defer       Family History:  History reviewed. No pertinent family history.    Past Surgical History:  History reviewed. No pertinent surgical history.    Problem List:  Patient Active Problem List   Diagnosis   • ADHD, predominantly  inattentive type   • Encounter for long-term (current) use of other medications       Allergy:   Allergies   Allergen Reactions   • Amoxicillin Nausea And Vomiting        Discontinued Medications:  Medications Discontinued During This Encounter   Medication Reason   • Lisdexamfetamine Dimesylate (Vyvanse) 60 MG chewable tablet Reorder       Current Medications:   Current Outpatient Medications   Medication Sig Dispense Refill   • Lisdexamfetamine Dimesylate (Vyvanse) 60 MG chewable tablet Chew 1 tablet Every Morning 30 tablet 0     No current facility-administered medications for this visit.         Review of Symptoms:    Psychiatric/Behavioral: Negative for agitation, behavioral problems, confusion, decreased concentration, dysphoric mood, hallucinations, self-injury, sleep disturbance and suicidal ideas.   The patient is not nervous/anxious and is not hyperactive.        Physical Exam:   There were no vitals taken for this visit.    Mental Status Exam:   Hygiene:   good  Cooperation:  Cooperative  Eye Contact:  Good  Psychomotor Behavior:  Appropriate  Affect:  Appropriate  Mood: normal  Hopelessness: Denies  Speech:  Normal  Thought Process:  Goal directed and Linear  Thought Content:  Normal  Suicidal:  None  Homicidal:  None  Hallucinations:  None  Delusion:  None  Memory:  Intact  Orientation:  Person, Place, Time and Situation  Reliability:  good  Insight:  Good  Judgement:  Good  Impulse Control:  Good  Physical/Medical Issues:  No      MSE from 8/12/2022    was reviewed and no changes necessary  PHQ-9 Depression Screening  Little interest or pleasure in doing things? 1-->several days   Feeling down, depressed, or hopeless? 1-->several days   Trouble falling or staying asleep, or sleeping too much? 1-->several days   Feeling tired or having little energy? 2-->more than half the days   Poor appetite or overeating? 0-->not at all   Feeling bad about yourself - or that you are a failure or have let yourself or  your family down? 2-->more than half the days   Trouble concentrating on things, such as reading the newspaper or watching television? 0-->not at all   Moving or speaking so slowly that other people could have noticed? Or the opposite - being so fidgety or restless that you have been moving around a lot more than usual? 0-->not at all   Thoughts that you would be better off dead, or of hurting yourself in some way? 0-->not at all   PHQ-9 Total Score 7   If you checked off any problems, how difficult have these problems made it for you to do your work, take care of things at home, or get along with other people? somewhat difficult           Never smoker    I advised Annalisa of the risks of tobacco use.     Lab Results:   No visits with results within 3 Month(s) from this visit.   Latest known visit with results is:   No results found for any previous visit.       Assessment & Plan   Problems Addressed this Visit        Mental Health    ADHD, predominantly inattentive type - Primary (Chronic)    Relevant Medications    Lisdexamfetamine Dimesylate (Vyvanse) 60 MG chewable tablet   Other Visit Diagnoses     ADHD (attention deficit hyperactivity disorder), inattentive type  (Chronic)       Relevant Medications    Lisdexamfetamine Dimesylate (Vyvanse) 60 MG chewable tablet      Diagnoses       Codes Comments    ADHD, predominantly inattentive type    -  Primary ICD-10-CM: F90.0  ICD-9-CM: 314.00     ADHD (attention deficit hyperactivity disorder), inattentive type     ICD-10-CM: F90.0  ICD-9-CM: 314.00           Visit Diagnoses:    ICD-10-CM ICD-9-CM   1. ADHD, predominantly inattentive type  F90.0 314.00   2. ADHD (attention deficit hyperactivity disorder), inattentive type  F90.0 314.00       TREATMENT PLAN/GOALS: Continue supportive psychotherapy efforts and medications as indicated. Treatment and medication options discussed during today's visit. Patient ackowledged and verbally consented to continue with current  treatment plan and was educated on the importance of compliance with treatment and follow-up appointments.    MEDICATION ISSUES:  1. ADHD inattentive type -  Cont vyvanse chewable  60 mg ,    long term stimulant use discussed , the pt is using for work , her job requires concentration  No changes necessary ,stable,  complaint with tx and labs      2 long term therapeutic drug monitoring - 12/2/2022 consistent, 8/12/2022 consistent      INSPECT reviewed as expected - 12/13/22  - vyvanse last refill       PHQ scored 7 and indicated mild  Depression (mainly due to physical illness - viral)    MANPREET 7 scored 3   Patient screened positive for depression based on a PHQ-9 score of 7 on 12/27/2022. Follow-up recommendations include: no indications for antidepressants, score is elevated due to decreaesd E 2ry to virla illness .    Discussed medication options and treatment plan of prescribed medication as well as the risks, benefits, and side effects including potential falls, possible impaired driving and metabolic adversities among others. Patient is agreeable to call the office with any worsening of symptoms or onset of side effects. Patient is agreeable to call 911 or go to the nearest ER should he/she begin having SI/HI. No medication side effects or related complaints today.     MEDS ORDERED DURING VISIT:  New Medications Ordered This Visit   Medications   • Lisdexamfetamine Dimesylate (Vyvanse) 60 MG chewable tablet     Sig: Chew 1 tablet Every Morning     Dispense:  30 tablet     Refill:  0     Please dispense on or after Eleazar 10, 2023        Return in about 4 months (around 4/27/2023).          This document has been electronically signed by Patt Perry MD  December 27, 2022 14:35 EST

## 2023-02-07 DIAGNOSIS — F90.0 ADHD (ATTENTION DEFICIT HYPERACTIVITY DISORDER), INATTENTIVE TYPE: Chronic | ICD-10-CM

## 2023-02-07 NOTE — TELEPHONE ENCOUNTER
Rx Refill Note  Requested Prescriptions     Pending Prescriptions Disp Refills   • Lisdexamfetamine Dimesylate (Vyvanse) 60 MG chewable tablet 30 tablet 0     Sig: Chew 1 tablet Every Morning      Last office visit with prescribing clinician: 8/12/2022   Last telemedicine visit with prescribing clinician: Visit date not found   Next office visit with prescribing clinician: Visit date not found                         Would you like a call back once the refill request has been completed: [] Yes [] No    If the office needs to give you a call back, can they leave a voicemail: [] Yes [] No    Paris Manzo MA  02/07/23, 15:56 EST     Pt says always in stock at [facility];  Inspect printed; last fill 1-11

## 2023-02-08 RX ORDER — LISDEXAMFETAMINE DIMESYLATE 60 MG/1
60 TABLET, CHEWABLE ORAL EVERY MORNING
Qty: 30 TABLET | Refills: 0 | Status: SHIPPED | OUTPATIENT
Start: 2023-02-08 | End: 2023-03-10 | Stop reason: SDUPTHER

## 2023-03-10 DIAGNOSIS — F90.0 ADHD (ATTENTION DEFICIT HYPERACTIVITY DISORDER), INATTENTIVE TYPE: Chronic | ICD-10-CM

## 2023-03-10 RX ORDER — LISDEXAMFETAMINE DIMESYLATE 60 MG/1
60 TABLET, CHEWABLE ORAL EVERY MORNING
Qty: 30 TABLET | Refills: 0 | Status: SHIPPED | OUTPATIENT
Start: 2023-03-10 | End: 2023-04-05 | Stop reason: SDUPTHER

## 2023-03-10 NOTE — TELEPHONE ENCOUNTER
Rx Refill Note  Requested Prescriptions     Pending Prescriptions Disp Refills   • Lisdexamfetamine Dimesylate (Vyvanse) 60 MG chewable tablet 30 tablet 0     Sig: Chew 1 tablet Every Morning      Last office visit with prescribing clinician: 8/12/2022   Last telemedicine visit with prescribing clinician: Visit date not found   Next office visit with prescribing clinician: Visit date not found   Telemedicine with Patt Perry MD (12/27/2022)       [facility] Urine Drug Screen - (08/12/2022)                   Would you like a call back once the refill request has been completed: [] Yes [] No    If the office needs to give you a call back, can they leave a voicemail: [] Yes [] No    Paris Manzo MA  03/10/23, 08:23 EST     Inspect printed; last fill 2-10; LVM for pt to make an appt

## 2023-04-05 DIAGNOSIS — F90.0 ADHD (ATTENTION DEFICIT HYPERACTIVITY DISORDER), INATTENTIVE TYPE: Chronic | ICD-10-CM

## 2023-04-05 NOTE — TELEPHONE ENCOUNTER
Rx Refill Note  Requested Prescriptions     Pending Prescriptions Disp Refills   • Lisdexamfetamine Dimesylate (Vyvanse) 60 MG chewable tablet 30 tablet 0     Sig: Chew 1 tablet Every Morning      Last office visit with prescribing clinician: 8/12/2022   Last telemedicine visit with prescribing clinician: 5/17/2023   Next office visit with prescribing clinician: 5/17/2023   Telemedicine with Patt Perry MD (12/27/2022)       [facility] Urine Drug Screen - (08/12/2022)                   Would you like a call back once the refill request has been completed: [] Yes [] No    If the office needs to give you a call back, can they leave a voicemail: [] Yes [] No    Paris Manzo MA  04/05/23, 16:13 EDT     Inspect printed; last fill 3-14 for 30 pills

## 2023-04-06 RX ORDER — LISDEXAMFETAMINE DIMESYLATE 60 MG/1
60 TABLET, CHEWABLE ORAL EVERY MORNING
Qty: 30 TABLET | Refills: 0 | Status: SHIPPED | OUTPATIENT
Start: 2023-04-06

## 2023-05-09 DIAGNOSIS — F90.0 ADHD (ATTENTION DEFICIT HYPERACTIVITY DISORDER), INATTENTIVE TYPE: Chronic | ICD-10-CM

## 2023-05-09 RX ORDER — LISDEXAMFETAMINE DIMESYLATE 60 MG/1
60 TABLET, CHEWABLE ORAL EVERY MORNING
Qty: 30 TABLET | Refills: 0 | Status: SHIPPED | OUTPATIENT
Start: 2023-05-09

## 2023-05-09 NOTE — TELEPHONE ENCOUNTER
Rx Refill Note  Requested Prescriptions     Pending Prescriptions Disp Refills   • Lisdexamfetamine Dimesylate (Vyvanse) 60 MG chewable tablet 30 tablet 0     Sig: Chew 1 tablet Every Morning      Last office visit with prescribing clinician: 8/12/2022   Last telemedicine visit with prescribing clinician: 4/5/2023   Next office visit with prescribing clinician: 5/17/2023   Telemedicine with Patt Perry MD (12/27/2022)  [facility] Urine Drug Screen - (08/12/2022)                        Would you like a call back once the refill request has been completed: [] Yes [] No    If the office needs to give you a call back, can they leave a voicemail: [] Yes [] No    Paris Manzo MA  05/09/23, 14:40 EDT

## 2023-05-17 ENCOUNTER — OFFICE VISIT (OUTPATIENT)
Dept: PSYCHIATRY | Facility: CLINIC | Age: 38
End: 2023-05-17
Payer: COMMERCIAL

## 2023-05-17 DIAGNOSIS — F90.0 ADHD, PREDOMINANTLY INATTENTIVE TYPE: Primary | Chronic | ICD-10-CM

## 2023-05-17 DIAGNOSIS — Z79.899 ENCOUNTER FOR LONG-TERM (CURRENT) USE OF OTHER MEDICATIONS: ICD-10-CM

## 2023-05-17 RX ORDER — LISDEXAMFETAMINE DIMESYLATE 60 MG/1
60 TABLET, CHEWABLE ORAL EVERY MORNING
Qty: 30 TABLET | Refills: 0 | Status: SHIPPED | OUTPATIENT
Start: 2023-05-17

## 2023-05-17 NOTE — PROGRESS NOTES
Subjective   Annalisa Fuentes is a 37 y.o. female who presents today for follow up        Chief Complaint:   To f/u on decreased concentration , to refill meds to maintain stability      History of Present Illness:     Long history of ADHD,  Was  on different meds in the past, was stable on current meds     Today the pt reported no major problems, denied feeling depressed/hopeless/helpless  Denied AVH/SI/HI  Concentration is good on meds, tolerates well    When on meds - the pt is less distracted , able to finish the project on time,  trying to stay busy , her job requires a lot of concentration     Concentration is good on  vyvanse chewable last long enough       No irritability , no manic episodes     concentration - good on meds, does not always use on the weekend   Sleep is good and restorative       Precipitating and Ameliorating Factors: job related stress  alleviating factors - to spend time with baby animals (suirrels)   Dogs         PAST PSYCHIATRIC HISTORY      Previous Psychiatric Diagnoses   Axis I: Attention Deficit Disorder     Past Hospitalizations or Residential Treatment   Locations\Providers: none      Past Outpatient Treatment   Diagnosis Treated: ADD  Location: PCP      Prior Psychiatric Medications   Comments: adderall - skin picking       Consequences of Mental Disorder   Consequences: emotional distress     SOCIAL HISTORY     Number of marriages: 1  Number of children: 0  Current Relationship is: supportive  Family of Origin is: abusive     Current Living Situation   Lives with: spouse     Education   Level: some college     Employment   Job Status: full-time     Hobbies and Leisure Activities   Activity Type: quiet activities     Alcohol use   Freq. drinkin drink  Smoking History   Smoking Hx: Never smoker     Exercise   Exercise sessions (per wk): 1     Illicit Drug Use   Illicit Drugs used: none     FAMILY HISTORY OF MENTAL DISORDERS   fh Grandparents: Non-contributory  fh Mother:  Non-contributory  fh Father: Addictive Disorders - Alcoholism  fh Siblings: Non-contributory  fh Other: Non-contributory     The following portions of the patient's history were reviewed and updated as appropriate: allergies, current medications, past family history, past medical history, past social history, past surgical history and problem list.            Interval History  No Change - stable     Side Effects  Denied       Past Medical History:  Past Medical History:   Diagnosis Date   • ADHD (attention deficit hyperactivity disorder)        Social History:  Social History     Socioeconomic History   • Marital status:    Tobacco Use   • Smoking status: Never   • Smokeless tobacco: Never   Vaping Use   • Vaping Use: Never used   Substance and Sexual Activity   • Alcohol use: Yes     Comment: socially   • Drug use: No   • Sexual activity: Defer       Family History:  History reviewed. No pertinent family history.    Past Surgical History:  History reviewed. No pertinent surgical history.    Problem List:  Patient Active Problem List   Diagnosis   • ADHD, predominantly inattentive type   • Encounter for long-term (current) use of other medications       Allergy:   Allergies   Allergen Reactions   • Amoxicillin Nausea And Vomiting        Discontinued Medications:  Medications Discontinued During This Encounter   Medication Reason   • Lisdexamfetamine Dimesylate (Vyvanse) 60 MG chewable tablet Reorder       Current Medications:   Current Outpatient Medications   Medication Sig Dispense Refill   • Lisdexamfetamine Dimesylate (Vyvanse) 60 MG chewable tablet Chew 1 tablet Every Morning 30 tablet 0     No current facility-administered medications for this visit.         Review of Symptoms:    Psychiatric/Behavioral: Negative for agitation, behavioral problems, confusion, decreased concentration, dysphoric mood, hallucinations, self-injury, sleep disturbance and suicidal ideas.   The patient is not nervous/anxious  and is not hyperactive.        Physical Exam:   There were no vitals taken for this visit.    Mental Status Exam:   Hygiene:   good  Cooperation:  Cooperative  Eye Contact:  Good  Psychomotor Behavior:  Appropriate  Affect:  Appropriate  Mood: normal  Hopelessness: Denies  Speech:  Normal  Thought Process:  Goal directed and Linear  Thought Content:  Normal  Suicidal:  None  Homicidal:  None  Hallucinations:  None  Delusion:  None  Memory:  Intact  Orientation:  Person, Place, Time and Situation  Reliability:  good  Insight:  Good  Judgement:  Good  Impulse Control:  Good  Physical/Medical Issues:  No      MSE from 12/27/2022    was reviewed and no changes necessary      PHQ-9 Depression Screening  Little interest or pleasure in doing things? 0-->not at all   Feeling down, depressed, or hopeless? 0-->not at all   Trouble falling or staying asleep, or sleeping too much? 0-->not at all   Feeling tired or having little energy? 0-->not at all   Poor appetite or overeating? 0-->not at all   Feeling bad about yourself - or that you are a failure or have let yourself or your family down? 0-->not at all   Trouble concentrating on things, such as reading the newspaper or watching television? 0-->not at all   Moving or speaking so slowly that other people could have noticed? Or the opposite - being so fidgety or restless that you have been moving around a lot more than usual? 0-->not at all   Thoughts that you would be better off dead, or of hurting yourself in some way? 0-->not at all   PHQ-9 Total Score 0   If you checked off any problems, how difficult have these problems made it for you to do your work, take care of things at home, or get along with other people? not difficult at all           Never smoker    I advised Annalisa of the risks of tobacco use.     Lab Results:   No visits with results within 3 Month(s) from this visit.   Latest known visit with results is:   No results found for any previous visit.        Assessment & Plan   Problems Addressed this Visit        Health Encounters    Encounter for long-term (current) use of other medications    Relevant Orders    [facility] Abbreviated Urine Drug Screen -       Mental Health    ADHD, predominantly inattentive type - Primary (Chronic)    Relevant Medications    Lisdexamfetamine Dimesylate (Vyvanse) 60 MG chewable tablet    Other Relevant Orders    [facility] Abbreviated Urine Drug Screen -   Diagnoses       Codes Comments    ADHD, predominantly inattentive type    -  Primary ICD-10-CM: F90.0  ICD-9-CM: 314.00     Encounter for long-term (current) use of other medications     ICD-10-CM: Z79.899  ICD-9-CM: V58.69           Visit Diagnoses:    ICD-10-CM ICD-9-CM   1. ADHD, predominantly inattentive type  F90.0 314.00   2. Encounter for long-term (current) use of other medications  Z79.899 V58.69       TREATMENT PLAN/GOALS: Continue supportive psychotherapy efforts and medications as indicated. Treatment and medication options discussed during today's visit. Patient ackowledged and verbally consented to continue with current treatment plan and was educated on the importance of compliance with treatment and follow-up appointments.    MEDICATION ISSUES:  1. ADHD inattentive type -  Cont vyvanse chewable  60 mg ,    long term stimulant use discussed , the pt is using for work only , her job requires concentration  No changes necessary ,stable,  complaint with tx and labs    No side effects   2 long term therapeutic drug monitoring - 12/2/2022 consistent, 8/12/2022 consistent , will repeat today      INSPECT reviewed as expected - 5/9/23  - vyvanse last refill       PHQ scored 0 and indicated mild  Depression (mainly due to physical illness - viral)    MANPREET 7 scored 2   Patient screened positive for depression based on a PHQ-9 score of 0 on 5/17/2023. Follow-up recommendations include: no indications for antidepressants, score is elevated due to decreaesd E 2ry to virla illness  .    Discussed medication options and treatment plan of prescribed medication as well as the risks, benefits, and side effects including potential falls, possible impaired driving and metabolic adversities among others. Patient is agreeable to call the office with any worsening of symptoms or onset of side effects. Patient is agreeable to call 911 or go to the nearest ER should he/she begin having SI/HI. No medication side effects or related complaints today.     MEDS ORDERED DURING VISIT:  New Medications Ordered This Visit   Medications   • Lisdexamfetamine Dimesylate (Vyvanse) 60 MG chewable tablet     Sig: Chew 1 tablet Every Morning     Dispense:  30 tablet     Refill:  0     Please dispense on or after June 6, 2023        Return in about 4 months (around 9/17/2023).          This document has been electronically signed by Patt Perry MD  May 17, 2023 09:48 EDT

## 2023-08-04 DIAGNOSIS — F90.0 ADHD, PREDOMINANTLY INATTENTIVE TYPE: Chronic | ICD-10-CM

## 2023-08-04 RX ORDER — LISDEXAMFETAMINE DIMESYLATE 60 MG/1
60 TABLET, CHEWABLE ORAL EVERY MORNING
Qty: 30 TABLET | Refills: 0 | Status: SHIPPED | OUTPATIENT
Start: 2023-08-04

## 2023-08-31 DIAGNOSIS — F90.0 ADHD, PREDOMINANTLY INATTENTIVE TYPE: Chronic | ICD-10-CM

## 2023-08-31 RX ORDER — LISDEXAMFETAMINE DIMESYLATE 60 MG/1
60 TABLET, CHEWABLE ORAL EVERY MORNING
Qty: 30 TABLET | Refills: 0 | Status: SHIPPED | OUTPATIENT
Start: 2023-08-31

## 2023-08-31 NOTE — TELEPHONE ENCOUNTER
Rx Refill Note  Requested Prescriptions     Pending Prescriptions Disp Refills    Lisdexamfetamine Dimesylate (Vyvanse) 60 MG chewable tablet 30 tablet 0     Sig: Chew 1 tablet Every Morning      Last office visit with prescribing clinician: 5/17/2023   Last telemedicine visit with prescribing clinician: Visit date not found   Next office visit with prescribing clinician: 9/18/2024   [facility] Abbreviated Urine Drug Screen - (05/17/2023)    Office Visit with Patt Perry MD (05/17/2023)                     Would you like a call back once the refill request has been completed: [] Yes [] No    If the office needs to give you a call back, can they leave a voicemail: [] Yes [] No    Paris Manzo MA  08/31/23, 14:36 EDT    Pt says she is calling in a little early due to the holiday and sometimes waiting for a shipment; last fill 8-09-23

## 2023-09-19 ENCOUNTER — OFFICE VISIT (OUTPATIENT)
Dept: PSYCHIATRY | Facility: CLINIC | Age: 38
End: 2023-09-19
Payer: COMMERCIAL

## 2023-09-19 DIAGNOSIS — F90.0 ADHD, PREDOMINANTLY INATTENTIVE TYPE: Primary | Chronic | ICD-10-CM

## 2023-09-19 RX ORDER — LISDEXAMFETAMINE DIMESYLATE 60 MG/1
60 TABLET, CHEWABLE ORAL EVERY MORNING
Qty: 30 TABLET | Refills: 0 | Status: SHIPPED | OUTPATIENT
Start: 2023-09-19

## 2023-09-19 NOTE — PROGRESS NOTES
Subjective   Annalisa Fuentes is a 37 y.o. female who presents today for follow up        Chief Complaint:   To f/u on decreased concentration , to refill meds to maintain stability      History of Present Illness:     Long history of ADHD,  Was  on different meds in the past, was stable on current meds     Today the pt reported no major problems in general, vyvanse is generic now, not as effective   The pt denied feeling depressed/hopeless/helpless  Denied AVH/SI/HI  Concentration is good on meds, tolerates well    When on meds - the pt is less distracted , able to finish the project on time,  trying to stay busy , her job requires a lot of concentration, taking care of inventory      Concentration is good on  vyvanse chewable last long enough       No irritability , no manic episodes     concentration - good on meds, does not always use on the weekend   Sleep is good and restorative       Precipitating and Ameliorating Factors: job related stress  alleviating factors - to spend time with baby animals (suirrels)   Dogs         PAST PSYCHIATRIC HISTORY      Previous Psychiatric Diagnoses   Axis I: Attention Deficit Disorder     Past Hospitalizations or Residential Treatment   Locations\Providers: none      Past Outpatient Treatment   Diagnosis Treated: ADD  Location: PCP      Prior Psychiatric Medications   Comments: adderall - skin picking       Consequences of Mental Disorder   Consequences: emotional distress     SOCIAL HISTORY     Number of marriages: 1  Number of children: 0  Current Relationship is: supportive  Family of Origin is: abusive     Current Living Situation   Lives with: spouse     Education   Level: some college     Employment   Job Status: full-time     Hobbies and Leisure Activities   Activity Type: quiet activities     Alcohol use   Freq. drinkin drink  Smoking History   Smoking Hx: Never smoker     Exercise   Exercise sessions (per wk): 1     Illicit Drug Use   Illicit Drugs used: none      FAMILY HISTORY OF MENTAL DISORDERS   fh Grandparents: Non-contributory  fh Mother: Non-contributory  fh Father: Addictive Disorders - Alcoholism  fh Siblings: Non-contributory  fh Other: Non-contributory     The following portions of the patient's history were reviewed and updated as appropriate: allergies, current medications, past family history, past medical history, past social history, past surgical history and problem list.            Interval History  No Change - stable     Side Effects  Denied       Past Medical History:  Past Medical History:   Diagnosis Date    ADHD (attention deficit hyperactivity disorder)        Social History:  Social History     Socioeconomic History    Marital status:    Tobacco Use    Smoking status: Never    Smokeless tobacco: Never   Vaping Use    Vaping Use: Never used   Substance and Sexual Activity    Alcohol use: Yes     Comment: socially    Drug use: No    Sexual activity: Defer       Family History:  History reviewed. No pertinent family history.    Past Surgical History:  History reviewed. No pertinent surgical history.    Problem List:  Patient Active Problem List   Diagnosis    ADHD, predominantly inattentive type    Encounter for long-term (current) use of other medications       Allergy:   Allergies   Allergen Reactions    Amoxicillin Nausea And Vomiting        Discontinued Medications:  Medications Discontinued During This Encounter   Medication Reason    Lisdexamfetamine Dimesylate (Vyvanse) 60 MG chewable tablet Reorder         Current Medications:   Current Outpatient Medications   Medication Sig Dispense Refill    Lisdexamfetamine Dimesylate (Vyvanse) 60 MG chewable tablet Chew 1 tablet Every Morning 30 tablet 0     No current facility-administered medications for this visit.         Review of Symptoms:    Psychiatric/Behavioral: Negative for agitation, behavioral problems, confusion, decreased concentration, dysphoric mood, hallucinations, self-injury,  sleep disturbance and suicidal ideas.   The patient is not nervous/anxious and is not hyperactive.        Physical Exam:   There were no vitals taken for this visit.    Mental Status Exam:   Hygiene:   good  Cooperation:  Cooperative  Eye Contact:  Good  Psychomotor Behavior:  Appropriate  Affect:  Appropriate  Mood: normal  Hopelessness: Denies  Speech:  Normal  Thought Process:  Goal directed and Linear  Thought Content:  Normal  Suicidal:  None  Homicidal:  None  Hallucinations:  None  Delusion:  None  Memory:  Intact  Orientation:  Person, Place, Time and Situation  Reliability:  good  Insight:  Good  Judgement:  Good  Impulse Control:  Good  Physical/Medical Issues:  No      MSE from 5/17/23     was reviewed and no changes necessary      PHQ-9 Depression Screening  Little interest or pleasure in doing things? 0-->not at all   Feeling down, depressed, or hopeless? 0-->not at all   Trouble falling or staying asleep, or sleeping too much?     Feeling tired or having little energy?     Poor appetite or overeating?     Feeling bad about yourself - or that you are a failure or have let yourself or your family down?     Trouble concentrating on things, such as reading the newspaper or watching television?     Moving or speaking so slowly that other people could have noticed? Or the opposite - being so fidgety or restless that you have been moving around a lot more than usual?     Thoughts that you would be better off dead, or of hurting yourself in some way?     PHQ-9 Total Score 0   If you checked off any problems, how difficult have these problems made it for you to do your work, take care of things at home, or get along with other people? not difficult at all           Never smoker    I advised Annalisa of the risks of tobacco use.     Lab Results:   No visits with results within 3 Month(s) from this visit.   Latest known visit with results is:   No results found for any previous visit.       Assessment & Plan    Problems Addressed this Visit          Mental Health    ADHD, predominantly inattentive type - Primary (Chronic)    Relevant Medications    Lisdexamfetamine Dimesylate (Vyvanse) 60 MG chewable tablet     Diagnoses         Codes Comments    ADHD, predominantly inattentive type    -  Primary ICD-10-CM: F90.0  ICD-9-CM: 314.00             Visit Diagnoses:    ICD-10-CM ICD-9-CM   1. ADHD, predominantly inattentive type  F90.0 314.00         TREATMENT PLAN/GOALS: Continue supportive psychotherapy efforts and medications as indicated. Treatment and medication options discussed during today's visit. Patient ackowledged and verbally consented to continue with current treatment plan and was educated on the importance of compliance with treatment and follow-up appointments.    MEDICATION ISSUES:  1. ADHD inattentive type -  Cont vyvanse chewable  60 mg ,    long term stimulant use discussed , the pt is using for work only , her job requires concentration  No changes necessary ,stable,  complaint with tx and labs , no signs of abuse/misuse   No side effects   2 long term therapeutic drug monitoring - 12/2/2022 consistent, 8/12/2022 consistent , 5/17/23 - consistent      INSPECT reviewed as expected - 8/8/23  - vyvanse last refill  , 8/31/23 another rx was sent        PHQ scored 0 and indicated mild  Depression (mainly due to physical illness - viral)    MANPRETE 7 scored 2   Patient screened positive for depression based on a PHQ-9 score of 0 on 9/19/2023. Follow-up recommendations include:  no indications for antidepressants, score is elevated due to decreaesd E 2ry to virla illness  .    Discussed medication options and treatment plan of prescribed medication as well as the risks, benefits, and side effects including potential falls, possible impaired driving and metabolic adversities among others. Patient is agreeable to call the office with any worsening of symptoms or onset of side effects. Patient is agreeable to call 911  or go to the nearest ER should he/she begin having SI/HI. No medication side effects or related complaints today.     MEDS ORDERED DURING VISIT:  New Medications Ordered This Visit   Medications    Lisdexamfetamine Dimesylate (Vyvanse) 60 MG chewable tablet     Sig: Chew 1 tablet Every Morning     Dispense:  30 tablet     Refill:  0     Please dispense when it is due        Return in about 4 months (around 1/19/2024).          This document has been electronically signed by Patt Perry MD  September 19, 2023 08:33 EDT

## 2023-10-05 ENCOUNTER — TELEPHONE (OUTPATIENT)
Dept: PSYCHIATRY | Facility: CLINIC | Age: 38
End: 2023-10-05
Payer: COMMERCIAL

## 2023-10-05 DIAGNOSIS — F90.0 ADHD, PREDOMINANTLY INATTENTIVE TYPE: Primary | ICD-10-CM

## 2023-10-05 RX ORDER — LISDEXAMFETAMINE DIMESYLATE CAPSULES 60 MG/1
60 CAPSULE ORAL EVERY MORNING
Qty: 30 CAPSULE | Refills: 0 | Status: SHIPPED | OUTPATIENT
Start: 2023-10-05

## 2023-10-05 NOTE — TELEPHONE ENCOUNTER
Pt says she used to pull the capsules in half, so she is fine with that.  She says generic Vyvanse 60mg is in stock.

## 2023-10-05 NOTE — TELEPHONE ENCOUNTER
Pt says she hasn't picked up her Vyvanse, because there is a chewable shortage, but capsules are in stock.    She was also wondering if the strength is the same if she gets a capsule?    Do you want me to cancel old script to resend capsules?

## 2023-10-05 NOTE — TELEPHONE ENCOUNTER
How is she going to cut capsule in half?     She is taking vyvanse chewable 30 mg (0.5 tab ) 2 times per day    Does she want to try xelstrym transdermal that lasts up to 9 hrs?

## 2023-10-30 ENCOUNTER — TELEPHONE (OUTPATIENT)
Dept: PSYCHIATRY | Facility: CLINIC | Age: 38
End: 2023-10-30
Payer: COMMERCIAL

## 2023-10-30 DIAGNOSIS — F90.0 ADHD, PREDOMINANTLY INATTENTIVE TYPE: Primary | ICD-10-CM

## 2023-10-30 NOTE — TELEPHONE ENCOUNTER
Pt says she would like to switch back to chewable Vyvanse 60 mg, because the capsules and method of delivery seem to have side effects of irritability and crashes.  She says [facility] has in stock.  Last fill 10-05-23; UPLOADED.

## 2023-10-31 RX ORDER — LISDEXAMFETAMINE DIMESYLATE 60 MG/1
60 TABLET, CHEWABLE ORAL EVERY MORNING
Qty: 30 TABLET | Refills: 0 | Status: SHIPPED | OUTPATIENT
Start: 2023-10-31

## 2023-11-27 DIAGNOSIS — F90.0 ADHD, PREDOMINANTLY INATTENTIVE TYPE: ICD-10-CM

## 2023-11-27 NOTE — TELEPHONE ENCOUNTER
Rx Refill Note  Requested Prescriptions     Pending Prescriptions Disp Refills    Lisdexamfetamine Dimesylate (Vyvanse) 60 MG chewable tablet 30 tablet 0     Sig: Chew 1 tablet Every Morning      Last office visit with prescribing clinician: 9/19/2023   Last telemedicine visit with prescribing clinician: Visit date not found   Next office visit with prescribing clinician: 1/23/2024   Office Visit with Patt Perry MD (09/19/2023)   [facility] Abbreviated Urine Drug Screen - (05/17/2023)                     Would you like a call back once the refill request has been completed: [] Yes [] No    If the office needs to give you a call back, can they leave a voicemail: [] Yes [] No    Paris Manzo MA  11/27/23, 15:31 EST    PT SAYS SHE PREFERS NAME BRAND AND WILL WAIT FOR SHIPMENT AT [facility]; LAST FILL 11-4-23; UPLOADED

## 2023-11-28 RX ORDER — LISDEXAMFETAMINE DIMESYLATE 60 MG/1
60 TABLET, CHEWABLE ORAL EVERY MORNING
Qty: 30 TABLET | Refills: 0 | Status: SHIPPED | OUTPATIENT
Start: 2023-11-28

## 2023-12-27 DIAGNOSIS — F90.0 ADHD, PREDOMINANTLY INATTENTIVE TYPE: ICD-10-CM

## 2023-12-27 RX ORDER — LISDEXAMFETAMINE DIMESYLATE 60 MG/1
60 TABLET, CHEWABLE ORAL EVERY MORNING
Qty: 30 TABLET | Refills: 0 | Status: SHIPPED | OUTPATIENT
Start: 2023-12-27

## 2023-12-27 NOTE — TELEPHONE ENCOUNTER
Rx Refill Note  Requested Prescriptions     Pending Prescriptions Disp Refills    Vyvanse 60 MG chewable tablet 30 tablet 0     Sig: Chew 1 tablet Every Morning      Last office visit with prescribing clinician: 9/19/2023   Last telemedicine visit with prescribing clinician: Visit date not found   Next office visit with prescribing clinician: 1/23/2024   Office Visit with Patt Perry MD (09/19/2023)   [facility] Abbreviated Urine Drug Screen - (05/17/2023)                     Would you like a call back once the refill request has been completed: [] Yes [] No    If the office needs to give you a call back, can they leave a voicemail: [] Yes [] No    Paris Manzo MA  12/27/23, 14:16 EST    PT WILL WAIT FOR A SHIPMENT AT [facility]; PREFERS BRAND NAME; LAST FILL 12-4-23; PT CALLING EARLY FOR THE HOLIDAY; UPLOADED

## 2024-01-23 ENCOUNTER — OFFICE VISIT (OUTPATIENT)
Dept: PSYCHIATRY | Facility: CLINIC | Age: 39
End: 2024-01-23
Payer: COMMERCIAL

## 2024-01-23 DIAGNOSIS — F90.0 ADHD, PREDOMINANTLY INATTENTIVE TYPE: Primary | Chronic | ICD-10-CM

## 2024-01-23 PROCEDURE — 99213 OFFICE O/P EST LOW 20 MIN: CPT | Performed by: PSYCHIATRY & NEUROLOGY

## 2024-01-23 RX ORDER — LISDEXAMFETAMINE DIMESYLATE 60 MG/1
60 TABLET, CHEWABLE ORAL EVERY MORNING
Qty: 30 TABLET | Refills: 0 | Status: SHIPPED | OUTPATIENT
Start: 2024-01-23

## 2024-01-23 NOTE — PROGRESS NOTES
Subjective   Annalisa Fuentes is a 38 y.o. female who presents today for follow up        Chief Complaint:   To f/u on decreased concentration , to refill meds to maintain stability      History of Present Illness:     Long history of ADHD,  Was  on different meds in the past, was stable on current meds     Today the pt reported no major problems in general, stable on meds  vyvanse is generic now, but not as effective   The pt denied feeling depressed/hopeless/helpless  Anxiety is manageable   Denied AVH/SI/HI  Concentration is good on meds, tolerates well  , lasts long enough   When on meds - the pt is less distracted , able to finish the project on time,  trying to stay busy , her job requires a lot of concentration, taking care of inventory    No procrastination   Concentration is good on  vyvanse chewable       No irritability , no manic episodes     concentration - good on meds, does not always use on the weekend   Sleep is good and restorative       Precipitating and Ameliorating Factors: job related stress  alleviating factors - to spend time with baby animals (suirrels)   Dogs         PAST PSYCHIATRIC HISTORY      Previous Psychiatric Diagnoses   Axis I: Attention Deficit Disorder     Past Hospitalizations or Residential Treatment   Locations\Providers: none      Past Outpatient Treatment   Diagnosis Treated: ADD  Location: PCP      Prior Psychiatric Medications   Comments: adderall - skin picking       Consequences of Mental Disorder   Consequences: emotional distress     SOCIAL HISTORY     Number of marriages: 1  Number of children: 0  Current Relationship is: supportive  Family of Origin is: abusive     Current Living Situation   Lives with: spouse     Education   Level: some college     Employment   Job Status: full-time     Hobbies and Leisure Activities   Activity Type: quiet activities     Alcohol use   Freq. drinkin drink  Smoking History   Smoking Hx: Never smoker     Exercise   Exercise  sessions (per wk): 1     Illicit Drug Use   Illicit Drugs used: none     FAMILY HISTORY OF MENTAL DISORDERS   fh Grandparents: Non-contributory  fh Mother: Non-contributory  fh Father: Addictive Disorders - Alcoholism  fh Siblings: Non-contributory  fh Other: Non-contributory     The following portions of the patient's history were reviewed and updated as appropriate: allergies, current medications, past family history, past medical history, past social history, past surgical history and problem list.            Interval History  No Change - stable     Side Effects  Denied       Past Medical History:  Past Medical History:   Diagnosis Date    ADHD (attention deficit hyperactivity disorder)        Social History:  Social History     Socioeconomic History    Marital status:    Tobacco Use    Smoking status: Never    Smokeless tobacco: Never   Vaping Use    Vaping Use: Never used   Substance and Sexual Activity    Alcohol use: Yes     Comment: socially    Drug use: No    Sexual activity: Defer       Family History:  History reviewed. No pertinent family history.    Past Surgical History:  History reviewed. No pertinent surgical history.    Problem List:  Patient Active Problem List   Diagnosis    ADHD, predominantly inattentive type    Encounter for long-term (current) use of other medications       Allergy:   Allergies   Allergen Reactions    Amoxicillin Nausea And Vomiting        Discontinued Medications:  Medications Discontinued During This Encounter   Medication Reason    Vyvanse 60 MG chewable tablet Reorder           Current Medications:   Current Outpatient Medications   Medication Sig Dispense Refill    Vyvanse 60 MG chewable tablet Chew 1 tablet Every Morning 30 tablet 0     No current facility-administered medications for this visit.         Review of Symptoms:    Psychiatric/Behavioral: Negative for agitation, behavioral problems, confusion, decreased concentration, dysphoric mood, hallucinations,  self-injury, sleep disturbance and suicidal ideas.   The patient is not nervous/anxious and is not hyperactive.        Physical Exam:   There were no vitals taken for this visit.    Mental Status Exam:   Hygiene:   good  Cooperation:  Cooperative  Eye Contact:  Good  Psychomotor Behavior:  Appropriate  Affect:  Appropriate  Mood: normal  Hopelessness: Denies  Speech:  Normal  Thought Process:  Goal directed and Linear  Thought Content:  Normal  Suicidal:  None  Homicidal:  None  Hallucinations:  None  Delusion:  None  Memory:  Intact  Orientation:  Person, Place, Time and Situation  Reliability:  good  Insight:  Good  Judgement:  Good  Impulse Control:  Good  Physical/Medical Issues:  No      MSE from 9/19/23     was reviewed and no changes necessary      PHQ-9 Depression Screening  Little interest or pleasure in doing things? 0-->not at all   Feeling down, depressed, or hopeless? 0-->not at all   Trouble falling or staying asleep, or sleeping too much? 0-->not at all   Feeling tired or having little energy? 0-->not at all   Poor appetite or overeating? 0-->not at all   Feeling bad about yourself - or that you are a failure or have let yourself or your family down? 0-->not at all   Trouble concentrating on things, such as reading the newspaper or watching television? 2-->more than half the days   Moving or speaking so slowly that other people could have noticed? Or the opposite - being so fidgety or restless that you have been moving around a lot more than usual? 0-->not at all   Thoughts that you would be better off dead, or of hurting yourself in some way? 0-->not at all   PHQ-9 Total Score 2   If you checked off any problems, how difficult have these problems made it for you to do your work, take care of things at home, or get along with other people? somewhat difficult           Never smoker    I advised Annalisa of the risks of tobacco use.     Lab Results:   No visits with results within 3 Month(s) from this  visit.   Latest known visit with results is:   No results found for any previous visit.       Assessment & Plan   Problems Addressed this Visit       ADHD, predominantly inattentive type - Primary (Chronic)    Relevant Medications    Vyvanse 60 MG chewable tablet     Diagnoses         Codes Comments    ADHD, predominantly inattentive type    -  Primary ICD-10-CM: F90.0  ICD-9-CM: 314.00             Visit Diagnoses:    ICD-10-CM ICD-9-CM   1. ADHD, predominantly inattentive type  F90.0 314.00           TREATMENT PLAN/GOALS: Continue supportive psychotherapy efforts and medications as indicated. Treatment and medication options discussed during today's visit. Patient ackowledged and verbally consented to continue with current treatment plan and was educated on the importance of compliance with treatment and follow-up appointments.    MEDICATION ISSUES:  1. ADHD inattentive type -  Cont vyvanse chewable  60 mg ,    long term stimulant use discussed , the pt is using for work only , her job requires concentration  No changes necessary , stable,  complaint with tx and labs , no signs of abuse/misuse   No side effects   2 long term therapeutic drug monitoring - 12/2/2022 consistent, 8/12/2022 consistent , 5/17/23 - consistent      INSPECT reviewed as expected - 1/3/24  - vyvanse last refill            PHQ scored 0 and indicated mild  Depression (mainly due to physical illness - viral)    MANPREET 7 scored 2   Patient screened positive for depression based on a PHQ-9 score of 2 on 1/23/2024. Follow-up recommendations include:  no indications for antidepressants, score is elevated due to decreaesd E 2ry to virla illness  .    Discussed medication options and treatment plan of prescribed medication as well as the risks, benefits, and side effects including potential falls, possible impaired driving and metabolic adversities among others. Patient is agreeable to call the office with any worsening of symptoms or onset of side  effects. Patient is agreeable to call 911 or go to the nearest ER should he/she begin having SI/HI. No medication side effects or related complaints today.     MEDS ORDERED DURING VISIT:  New Medications Ordered This Visit   Medications    Vyvanse 60 MG chewable tablet     Sig: Chew 1 tablet Every Morning     Dispense:  30 tablet     Refill:  0     Please dispense when it is due        Return in about 4 months (around 5/23/2024).          This document has been electronically signed by Patt Perry MD  January 23, 2024 15:54 EST

## 2024-02-28 ENCOUNTER — TELEPHONE (OUTPATIENT)
Dept: PSYCHIATRY | Facility: CLINIC | Age: 39
End: 2024-02-28
Payer: COMMERCIAL

## 2024-02-28 DIAGNOSIS — F90.0 ADHD, PREDOMINANTLY INATTENTIVE TYPE: Chronic | ICD-10-CM

## 2024-02-28 RX ORDER — LISDEXAMFETAMINE DIMESYLATE 60 MG/1
60 TABLET, CHEWABLE ORAL EVERY MORNING
Qty: 30 TABLET | Refills: 0 | Status: SHIPPED | OUTPATIENT
Start: 2024-02-28

## 2024-02-28 NOTE — TELEPHONE ENCOUNTER
Refill request on post it from Joana given to me about pt needing name brand only Vyvanse.    I called pt back and lvm to see if she wants a paper script to take to [facility].  Waiting for her to call back.  Last fill 2-2-24; UPLOADED.

## 2024-02-28 NOTE — TELEPHONE ENCOUNTER
PT WOULD LIKE A PAPER SCRIPT.  PLEASE ADVISE WHEN READY.  SHE SAYS SHE CAN PU ON HER LUNCH ANYTIME BETWEEN 12 TO 3 PM.

## 2024-03-02 DIAGNOSIS — F90.0 ADHD, PREDOMINANTLY INATTENTIVE TYPE: Chronic | ICD-10-CM

## 2024-03-04 RX ORDER — LISDEXAMFETAMINE DIMESYLATE 60 MG/1
TABLET, CHEWABLE ORAL
Qty: 30 TABLET | Refills: 0 | OUTPATIENT
Start: 2024-03-04

## 2024-03-26 DIAGNOSIS — F90.0 ADHD, PREDOMINANTLY INATTENTIVE TYPE: Chronic | ICD-10-CM

## 2024-03-26 NOTE — TELEPHONE ENCOUNTER
Rx Refill Note  Requested Prescriptions     Pending Prescriptions Disp Refills    Vyvanse 60 MG chewable tablet 30 tablet 0     Sig: Chew 1 tablet Every Morning      Last office visit with prescribing clinician: 1/23/2024   Last telemedicine visit with prescribing clinician: Visit date not found   Next office visit with prescribing clinician: 5/14/2024   Office Visit with Patt Perry MD (01/23/2024)   [facility] Abbreviated Urine Drug Screen - (05/17/2023)                     Would you like a call back once the refill request has been completed: [] Yes [] No    If the office needs to give you a call back, can they leave a voicemail: [] Yes [] No    Paris Manzo MA  03/26/24, 15:29 EDT    Last fill 3-2-24; pt says she will wait for a shipment at [facility] in [place]; UPLOADED

## 2024-03-28 RX ORDER — LISDEXAMFETAMINE DIMESYLATE 60 MG/1
60 TABLET, CHEWABLE ORAL EVERY MORNING
Qty: 30 TABLET | Refills: 0 | Status: SHIPPED | OUTPATIENT
Start: 2024-03-28

## 2024-04-24 DIAGNOSIS — F90.0 ADHD, PREDOMINANTLY INATTENTIVE TYPE: Chronic | ICD-10-CM

## 2024-04-24 NOTE — TELEPHONE ENCOUNTER
"Rx Refill Note  Requested Prescriptions     Pending Prescriptions Disp Refills    Vyvanse 60 MG chewable tablet 30 tablet 0     Sig: Chew 1 tablet Every Morning      Last office visit with prescribing clinician: 1/23/2024   Last telemedicine visit with prescribing clinician: Visit date not found   Next office visit with prescribing clinician: 5/14/2024   Office Visit with Patt Perry MD (01/23/2024)   [facility] Abbreviated Urine Drug Screen - (05/17/2023)                     Would you like a call back once the refill request has been completed: [] Yes [] No    If the office needs to give you a call back, can they leave a voicemail: [] Yes [] No    Paris Manzo MA  04/24/24, 13:10 EDT    PT SAYS CURRENTLY NAME BRAND IN STOCK AGAIN AT [facility] IN , BUT IF THEY RUN OUT, SHE WILL HAVE TO CALL AROUND TO SEE WHO HAS IT, BECAUSE SHE HAS BEEN ON IT 30 YEARS AND GETS VERY LETHARGIC WITHOUT IT;  SHE SAYS IF YOU PUT A NOTE THAT \"IT IS OKAY TO FILL EARLY,\" THE PHARMACY WON'T RELEASE UNTIL INSURANCE WILL PAY, AND ITS POSSIBLE SHE CAN HAVE 1ST DIBS GETTING HER SCRIPT FILLED WHEN A SHIPMENT COMES IN.  I EXPLAINED THAT YOU PREFER HER TO WAIT FOR A SHIPMENT IF POSSIBLE, BUT SHE SAYS SHE REALLY CANNOT FUNCTION AFTER 2 DAYS WITHOUT IT; LAST FILL 4-1-24; INSPECT UPLOADED  "

## 2024-04-26 RX ORDER — LISDEXAMFETAMINE DIMESYLATE 60 MG/1
60 TABLET, CHEWABLE ORAL EVERY MORNING
Qty: 30 TABLET | Refills: 0 | Status: SHIPPED | OUTPATIENT
Start: 2024-04-26

## 2024-05-14 ENCOUNTER — OFFICE VISIT (OUTPATIENT)
Dept: PSYCHIATRY | Facility: CLINIC | Age: 39
End: 2024-05-14
Payer: COMMERCIAL

## 2024-05-14 DIAGNOSIS — F90.0 ADHD, PREDOMINANTLY INATTENTIVE TYPE: Primary | ICD-10-CM

## 2024-05-14 DIAGNOSIS — Z79.899 ENCOUNTER FOR LONG-TERM (CURRENT) USE OF OTHER MEDICATIONS: ICD-10-CM

## 2024-05-14 PROCEDURE — 99214 OFFICE O/P EST MOD 30 MIN: CPT | Performed by: PSYCHIATRY & NEUROLOGY

## 2024-05-14 RX ORDER — LISDEXAMFETAMINE DIMESYLATE 60 MG/1
60 TABLET, CHEWABLE ORAL EVERY MORNING
Qty: 30 TABLET | Refills: 0 | Status: SHIPPED | OUTPATIENT
Start: 2024-05-14

## 2024-05-14 NOTE — PROGRESS NOTES
Subjective   Annalisa Fuentes is a 38 y.o. female who presents today for follow up        Chief Complaint:   To f/u on decreased concentration , to refill meds to maintain stability      History of Present Illness:     Long history of ADHD,  Was  on different meds in the past, was stable on current meds     Today the pt reported no major problems in general, stable on meds, stay busy at work  vyvanse is generic now, but not as effective   The pt denied feeling depressed/hopeless/helpless  Anxiety is manageable, no panic attacks    Denied AVH/SI/HI  Concentration is good on meds, tolerates well  , lasts long enough, able to finish her work    When on meds - the pt is less distracted ,    trying to stay busy , her job requires a lot of concentration, taking care of inventory    No procrastination    No side effects , no chest discomfort    No irritability , no manic episodes     concentration - good on meds, does not always use on the weekend   Sleep is good and restorative       Precipitating and Ameliorating Factors: job related stress  alleviating factors - to spend time with baby animals (suirrels)   Dogs         PAST PSYCHIATRIC HISTORY      Previous Psychiatric Diagnoses   Axis I: Attention Deficit Disorder     Past Hospitalizations or Residential Treatment   Locations\Providers: none      Past Outpatient Treatment   Diagnosis Treated: ADD  Location: PCP      Prior Psychiatric Medications   Comments: adderall - skin picking       Consequences of Mental Disorder   Consequences: emotional distress     SOCIAL HISTORY     Number of marriages: 1  Number of children: 0  Current Relationship is: supportive  Family of Origin is: abusive     Current Living Situation   Lives with: spouse     Education   Level: some college     Employment   Job Status: full-time     Hobbies and Leisure Activities   Activity Type: quiet activities     Alcohol use   Freq. drinkin drink  Smoking History   Smoking Hx: Never smoker      Exercise   Exercise sessions (per wk): 1     Illicit Drug Use   Illicit Drugs used: none     FAMILY HISTORY OF MENTAL DISORDERS   fh Grandparents: Non-contributory  fh Mother: Non-contributory  fh Father: Addictive Disorders - Alcoholism  fh Siblings: Non-contributory  fh Other: Non-contributory     The following portions of the patient's history were reviewed and updated as appropriate: allergies, current medications, past family history, past medical history, past social history, past surgical history and problem list.            Interval History  No Change - stable     Side Effects  Denied       Past Medical History:  Past Medical History:   Diagnosis Date    ADHD (attention deficit hyperactivity disorder)        Social History:  Social History     Socioeconomic History    Marital status:    Tobacco Use    Smoking status: Never    Smokeless tobacco: Never   Vaping Use    Vaping status: Never Used   Substance and Sexual Activity    Alcohol use: Yes     Comment: socially    Drug use: No    Sexual activity: Defer       Family History:  History reviewed. No pertinent family history.    Past Surgical History:  History reviewed. No pertinent surgical history.    Problem List:  Patient Active Problem List   Diagnosis    ADHD, predominantly inattentive type    Encounter for long-term (current) use of other medications       Allergy:   Allergies   Allergen Reactions    Amoxicillin Nausea And Vomiting        Discontinued Medications:  Medications Discontinued During This Encounter   Medication Reason    Vyvanse 60 MG chewable tablet Reorder             Current Medications:   Current Outpatient Medications   Medication Sig Dispense Refill    Vyvanse 60 MG chewable tablet Chew 1 tablet Every Morning 30 tablet 0     No current facility-administered medications for this visit.         Review of Symptoms:    Psychiatric/Behavioral: Negative for agitation, behavioral problems, confusion, decreased concentration,  dysphoric mood, hallucinations, self-injury, sleep disturbance and suicidal ideas.   The patient is not nervous/anxious and is not hyperactive.        Physical Exam:   There were no vitals taken for this visit.    Mental Status Exam:   Hygiene:   good  Cooperation:  Cooperative  Eye Contact:  Good  Psychomotor Behavior:  Appropriate  Affect:  Appropriate  Mood: normal  Hopelessness: Denies  Speech:  Normal  Thought Process:  Goal directed and Linear  Thought Content:  Normal  Suicidal:  None  Homicidal:  None  Hallucinations:  None  Delusion:  None  Memory:  Intact  Orientation:  Person, Place, Time and Situation  Reliability:  good  Insight:  Good  Judgement:  Good  Impulse Control:  Good  Physical/Medical Issues:  No      MSE from 1/3/24      was reviewed and no changes necessary      PHQ-9 Depression Screening  Little interest or pleasure in doing things? 0-->not at all   Feeling down, depressed, or hopeless? 0-->not at all   Trouble falling or staying asleep, or sleeping too much? 1-->several days   Feeling tired or having little energy? 0-->not at all   Poor appetite or overeating? 0-->not at all   Feeling bad about yourself - or that you are a failure or have let yourself or your family down? 0-->not at all   Trouble concentrating on things, such as reading the newspaper or watching television? 2-->more than half the days   Moving or speaking so slowly that other people could have noticed? Or the opposite - being so fidgety or restless that you have been moving around a lot more than usual? 0-->not at all   Thoughts that you would be better off dead, or of hurting yourself in some way? 0-->not at all   PHQ-9 Total Score 3   If you checked off any problems, how difficult have these problems made it for you to do your work, take care of things at home, or get along with other people? somewhat difficult           Never smoker    I advised Annalisa of the risks of tobacco use.     Lab Results:   No visits with results  within 3 Month(s) from this visit.   Latest known visit with results is:   No results found for any previous visit.       Assessment & Plan   Problems Addressed this Visit       ADHD, predominantly inattentive type - Primary (Chronic)    Relevant Medications    Vyvanse 60 MG chewable tablet    Other Relevant Orders    ToxAssure Flex 22, Ur w/DL -    Encounter for long-term (current) use of other medications    Relevant Orders    ToxAssure Flex 22, Ur w/DL -     Diagnoses         Codes Comments    ADHD, predominantly inattentive type    -  Primary ICD-10-CM: F90.0  ICD-9-CM: 314.00     Encounter for long-term (current) use of other medications     ICD-10-CM: Z79.899  ICD-9-CM: V58.69             Visit Diagnoses:    ICD-10-CM ICD-9-CM   1. ADHD, predominantly inattentive type  F90.0 314.00   2. Encounter for long-term (current) use of other medications  Z79.899 V58.69             TREATMENT PLAN/GOALS: Continue supportive psychotherapy efforts and medications as indicated. Treatment and medication options discussed during today's visit. Patient ackowledged and verbally consented to continue with current treatment plan and was educated on the importance of compliance with treatment and follow-up appointments.    MEDICATION ISSUES:  1. ADHD inattentive type -  Cont vyvanse chewable  60 mg ,    long term stimulant use discussed , the pt is using for work only , her job requires concentration  No change necessary, no signs of abuse/misuse , complaint with meds   No side effects     2.  long term therapeutic drug monitoring - 12/2/2022 consistent, 8/12/2022 consistent , 5/17/23 - consistent    LABORATORY - SCAN - ABBREVIATED URINE DRUG SCREEN. [facility], 05/17/2023 (05/17/2023) , will repeat today   INSPECT reviewed as expected - 4/30/24  - vyvanse last refill            PHQ scored 3 and indicated mild  Depression (mainly due to physical illness - viral)    MANPREET 7 scored 4   Patient screened positive for depression based on a  PHQ-9 score of 3 on 5/14/2024. Follow-up recommendations include:  no indications for antidepressants  .    Discussed medication options and treatment plan of prescribed medication as well as the risks, benefits, and side effects including potential falls, possible impaired driving and metabolic adversities among others. Patient is agreeable to call the office with any worsening of symptoms or onset of side effects. Patient is agreeable to call 911 or go to the nearest ER should he/she begin having SI/HI. No medication side effects or related complaints today.     MEDS ORDERED DURING VISIT:  New Medications Ordered This Visit   Medications    Vyvanse 60 MG chewable tablet     Sig: Chew 1 tablet Every Morning     Dispense:  30 tablet     Refill:  0     Please dispense when it is due        Return in about 4 months (around 9/14/2024).          This document has been electronically signed by Patt Perry MD  May 14, 2024 15:35 EDT

## 2024-05-18 LAB
1OH-MIDAZOLAM UR QL SCN: NOT DETECTED NG/MG CREAT
6MAM UR QL SCN: NEGATIVE NG/ML
7AMINOCLONAZEPAM/CREAT UR: NOT DETECTED NG/MG CREAT
8OH-AMOXAPINE UR QL: NOT DETECTED
8OH-LOXAPINE UR QL SCN: NOT DETECTED
A-OH ALPRAZ/CREAT UR: NOT DETECTED NG/MG CREAT
A-OH-TRIAZOLAM/CREAT UR CFM: NOT DETECTED NG/MG CREAT
ALPRAZ/CREAT UR CFM: NOT DETECTED NG/MG CREAT
AMITRIP UR-MCNC: NOT DETECTED NG/ML
AMOXAPINE UR QL: NOT DETECTED
AMPHET UR CFM-MCNC: 1459 NG/MG CREAT
AMPHETAMINES UR QL SCN: NORMAL NG/ML
AMPHETAMINES UR QL: NORMAL
ANTICONVULSANTS UR: NEGATIVE
ANTIPSYCHOTICS UR: NEGATIVE
ARIPIPRAZOLE UR QL SCN: NOT DETECTED
ASENAPINE UR QL CFM: NOT DETECTED
BARBITURATES UR QL SCN: NEGATIVE NG/ML
BENZODIAZ SCN METH UR: NEGATIVE
BUPRENORPHINE UR QL SCN: NEGATIVE NG/ML
BUPROPION UR QL: NOT DETECTED
CANNABINOIDS UR QL SCN: NEGATIVE NG/ML
CARISOPRODOL UR QL: NEGATIVE NG/ML
CHLORPROMAZINE UR QL: NOT DETECTED
CITALOPRAM, UR: NOT DETECTED
CLOMIPRAMINE UR-MCNC: NOT DETECTED NG/ML
CLONAZEPAM/CREAT UR CFM: NOT DETECTED NG/MG CREAT
CLOZAPINE UR QL: NOT DETECTED
COCAINE+BZE UR QL SCN: NEGATIVE NG/ML
CREAT UR-MCNC: 46 MG/DL
DESALKYLFLURAZ/CREAT UR: NOT DETECTED NG/MG CREAT
DESIPRAMINE UR-MCNC: NOT DETECTED NG/ML
DIAZEPAM/CREAT UR: NOT DETECTED NG/MG CREAT
DOXEPIN UR-MCNC: NOT DETECTED NG/ML
DULOXETINE UR QL: NOT DETECTED
ETHANOL UR QL SCN: NEGATIVE NG/ML
FENTANYL UR QL SCN: NEGATIVE NG/ML
FLUNITRAZEPAM UR QL SCN: NOT DETECTED NG/MG CREAT
FLUOXETINE UR QL SCN: NOT DETECTED
FLUPHENAZINE UR-MCNC: NOT DETECTED NG/ML
FLUVOXAMINE UR QL: NOT DETECTED
GABAPENTIN UR-MCNC: NEGATIVE UG/ML
HALOPERIDOL UR QL: NOT DETECTED
ILOPERIDONE UR QL CFM: NOT DETECTED
IMIPRAMINE UR-MCNC: NOT DETECTED NG/ML
KRATOM IA, UR: NEGATIVE NG/ML
LORAZEPAM/CREAT UR: NOT DETECTED NG/MG CREAT
LOXAPINE UR QL: NOT DETECTED
LURASIDONE UR QL CFM: NOT DETECTED
MAPROTILINE UR QL: NOT DETECTED
MDA UR CFM-MCNC: NOT DETECTED NG/MG CREAT
MDMA UR CFM-MCNC: NOT DETECTED NG/MG CREAT
ME-PHENIDATE UR QL SCN: NEGATIVE NG/ML
MESORIDAZINE UR QL: NOT DETECTED
METHADONE UR QL SCN: NEGATIVE NG/ML
METHADONE+METAB UR QL SCN: NEGATIVE NG/ML
METHAMPHET UR CFM-MCNC: NOT DETECTED NG/MG CREAT
MIDAZOLAM/CREAT UR CFM: NOT DETECTED NG/MG CREAT
MIRTAZAPINE UR-MCNC: NOT DETECTED UG/ML
MOLINDONE UR QL SCN: NOT DETECTED
NEFAZODONE UR QL: NOT DETECTED
NORCITALOPRAM UR QL: NOT DETECTED
NORCLOMIPRAMINE UR QL: NOT DETECTED
NORCLOZAPINE UR QL: NOT DETECTED
NORDIAZEPAM/CREAT UR: NOT DETECTED NG/MG CREAT
NORDOXEPIN UR QL: NOT DETECTED
NORFLUNITRAZEPAM UR-MCNC: NOT DETECTED NG/MG CREAT
NORFLUOXETINE UR-MCNC: NOT DETECTED NG/ML
NORSERTRALINE UR QL: NOT DETECTED
NORTRIP UR-MCNC: NOT DETECTED NG/ML
ODV UR-MCNC: NOT DETECTED NG/ML
OH-BUPROPION UR-MCNC: NOT DETECTED NG/ML
OLANZAPINE UR CFM-MCNC: NOT DETECTED NG/ML
OPIATES UR SCN-MCNC: NEGATIVE NG/ML
OXAZEPAM/CREAT UR: NOT DETECTED NG/MG CREAT
OXYCODONE CTO UR SCN-MCNC: NEGATIVE NG/ML
PAROXETINE UR-MCNC: NOT DETECTED NG/L
PCP UR QL SCN: NEGATIVE NG/ML
PERPHENAZINE UR QL: NOT DETECTED
PIMOZIDE, UR: NOT DETECTED
PREGABALIN UR QL SCN: NOT DETECTED
PRESCRIBED MEDICATIONS: NORMAL
PROCHLORPERAZINE UR QL: NOT DETECTED
PROPOXYPH UR QL SCN: NEGATIVE NG/ML
PROTRIP UR QL: NOT DETECTED
QUETIAPINE CTO UR CFM-MCNC: NOT DETECTED NG/ML
RISPERIDONE UR QL: NOT DETECTED
SERTRALINE UR-MCNC: NOT DETECTED NG/ML
TAPENTADOL CTO UR SCN-MCNC: NEGATIVE NG/ML
TEMAZEPAM/CREAT UR: NOT DETECTED NG/MG CREAT
THIORIDAZINE UR-MCNC: NOT DETECTED UG/ML
THIOTHIXENE UR QL: NOT DETECTED
TRAMADOL UR QL SCN: NEGATIVE NG/ML
TRAZODONE UR QL: NOT DETECTED
TRAZODONE UR-MCNC: NOT DETECTED UG/ML
TRICYCLICS TESTED UR SCN: NEGATIVE
TRIFPERAZINE UR QL: NOT DETECTED
TRIMIPRAMINE UR QL: NOT DETECTED
VENLAFAXINE UR QL: NOT DETECTED
VILAZ UR QL SCN: NOT DETECTED
ZIPRASIDONE UR QL SCN: NOT DETECTED

## 2024-06-24 DIAGNOSIS — F90.0 ADHD, PREDOMINANTLY INATTENTIVE TYPE: ICD-10-CM

## 2024-06-24 NOTE — TELEPHONE ENCOUNTER
Rx Refill Note  Requested Prescriptions     Pending Prescriptions Disp Refills    Vyvanse 60 MG chewable tablet 30 tablet 0     Sig: Chew 1 tablet Every Morning      Last office visit with prescribing clinician: 5/14/2024   Last telemedicine visit with prescribing clinician: Visit date not found   Next office visit with prescribing clinician: 9/12/2024   Office Visit with Patt Perry MD (05/14/2024)   ToxAssure Flex 22, Ur w/DL - Urine, Random Void (05/14/2024 15:45)                     Would you like a call back once the refill request has been completed: [] Yes [] No    If the office needs to give you a call back, can they leave a voicemail: [] Yes [] No    Paris Manzo MA  06/24/24, 14:44 EDT    PT SAYS IN STOCK AT [facility] IN ; LAST FILL 5-30-24; SHE SAYS SHE CALLS A LITTLE EARLY TO GET FIRST DIBS IN THE QUE AT HER PHARMACY; UPLOADED

## 2024-06-25 RX ORDER — LISDEXAMFETAMINE DIMESYLATE 60 MG/1
60 TABLET, CHEWABLE ORAL EVERY MORNING
Qty: 30 TABLET | Refills: 0 | Status: SHIPPED | OUTPATIENT
Start: 2024-06-25

## 2024-07-25 DIAGNOSIS — F90.0 ADHD, PREDOMINANTLY INATTENTIVE TYPE: ICD-10-CM

## 2024-07-25 RX ORDER — LISDEXAMFETAMINE DIMESYLATE 60 MG/1
60 TABLET, CHEWABLE ORAL EVERY MORNING
Qty: 30 TABLET | Refills: 0 | Status: SHIPPED | OUTPATIENT
Start: 2024-07-25

## 2024-07-25 NOTE — TELEPHONE ENCOUNTER
Rx Refill Note  Requested Prescriptions     Pending Prescriptions Disp Refills    Vyvanse 60 MG chewable tablet 30 tablet 0     Sig: Chew 1 tablet Every Morning      Last office visit with prescribing clinician: 5/14/2024   Last telemedicine visit with prescribing clinician: Visit date not found   Next office visit with prescribing clinician: 9/12/2024   Office Visit with Patt Perry MD (05/14/2024)   ToxAssure Flex 22, Ur w/DL - Urine, Random Void (05/14/2024 15:45)                     Would you like a call back once the refill request has been completed: [] Yes [] No    If the office needs to give you a call back, can they leave a voicemail: [] Yes [] No    Paris Manzo MA  07/25/24, 15:26 EDT    LAST FILL 6-29-24; PT SAYS IN STOCK AT [facility] IN [place]; UPLOADED

## 2024-08-22 DIAGNOSIS — F90.0 ADHD, PREDOMINANTLY INATTENTIVE TYPE: ICD-10-CM

## 2024-08-22 NOTE — TELEPHONE ENCOUNTER
Rx Refill Note  Requested Prescriptions     Pending Prescriptions Disp Refills    Vyvanse 60 MG chewable tablet 30 tablet 0     Sig: Chew 1 tablet Every Morning      Last office visit with prescribing clinician: 5/14/2024   Last telemedicine visit with prescribing clinician: Visit date not found   Next office visit with prescribing clinician: 9/12/2024   Office Visit with Patt Perry MD (05/14/2024)   ToxAssure Flex 22, Ur w/DL - Urine, Random Void (05/14/2024 15:45)                     Would you like a call back once the refill request has been completed: [] Yes [] No    If the office needs to give you a call back, can they leave a voicemail: [] Yes [] No    Paris Manzo MA  08/22/24, 14:36 EDT    LAST FILL 7-29-24; PT SAYS IN STOCK AT [facility] [place];  UPLOADED

## 2024-08-23 RX ORDER — LISDEXAMFETAMINE DIMESYLATE 60 MG/1
60 TABLET, CHEWABLE ORAL EVERY MORNING
Qty: 30 TABLET | Refills: 0 | Status: SHIPPED | OUTPATIENT
Start: 2024-08-23

## 2024-09-12 ENCOUNTER — OFFICE VISIT (OUTPATIENT)
Dept: PSYCHIATRY | Facility: CLINIC | Age: 39
End: 2024-09-12
Payer: COMMERCIAL

## 2024-09-12 DIAGNOSIS — F90.0 ADHD, PREDOMINANTLY INATTENTIVE TYPE: Primary | Chronic | ICD-10-CM

## 2024-09-12 NOTE — PROGRESS NOTES
Subjective   Annalisa Fuentes is a 38 y.o. female who presents today for follow up        Chief Complaint:   To f/u on decreased concentration , to refill meds to maintain stability      History of Present Illness:     Long history of ADHD,  Was  on different meds in the past, was stable on current meds     Today the pt reported no major problems in general,   The pt remains stable on meds, stay busy at work, productive, stays on task,   The pt denied feeling depressed, just N mood fluctuations depending on the situation   Anxiety is manageable, no panic attacks    Denied AVH/SI/HI  Concentration is good on meds, tolerates well,   Her job requires concentration, taking meds for work only   No procrastination    No side effects , no chest discomfort    No irritability , no manic episodes     Precipitating and Ameliorating Factors: job related stress  alleviating factors - to spend time with baby animals (suirrels)   Dogs         PAST PSYCHIATRIC HISTORY      Previous Psychiatric Diagnoses   Axis I: Attention Deficit Disorder     Past Hospitalizations or Residential Treatment   Locations\Providers: none      Past Outpatient Treatment   Diagnosis Treated: ADD  Location: PCP      Prior Psychiatric Medications   Comments: adderall - skin picking       Consequences of Mental Disorder   Consequences: emotional distress     SOCIAL HISTORY     Number of marriages: 1  Number of children: 0  Current Relationship is: supportive  Family of Origin is: abusive     Current Living Situation   Lives with: spouse     Education   Level: some college     Employment   Job Status: full-time     Hobbies and Leisure Activities   Activity Type: quiet activities     Alcohol use   Freq. drinkin drink  Smoking History   Smoking Hx: Never smoker     Exercise   Exercise sessions (per wk): 1     Illicit Drug Use   Illicit Drugs used: none     FAMILY HISTORY OF MENTAL DISORDERS   fh Grandparents: Non-contributory  fh Mother:  Non-contributory  fh Father: Addictive Disorders - Alcoholism  fh Siblings: Non-contributory  fh Other: Non-contributory     The following portions of the patient's history were reviewed and updated as appropriate: allergies, current medications, past family history, past medical history, past social history, past surgical history and problem list.            Interval History  No Change - stable     Side Effects  Denied       Past Medical History:  Past Medical History:   Diagnosis Date    ADHD (attention deficit hyperactivity disorder)        Social History:  Social History     Socioeconomic History    Marital status:    Tobacco Use    Smoking status: Never    Smokeless tobacco: Never   Vaping Use    Vaping status: Never Used   Substance and Sexual Activity    Alcohol use: Yes     Comment: socially    Drug use: No    Sexual activity: Defer       Family History:  History reviewed. No pertinent family history.    Past Surgical History:  No past surgical history on file.    Problem List:  Patient Active Problem List   Diagnosis    ADHD, predominantly inattentive type    Encounter for long-term (current) use of other medications       Allergy:   Allergies   Allergen Reactions    Amoxicillin Nausea And Vomiting        Discontinued Medications:  There are no discontinued medications.            Current Medications:   Current Outpatient Medications   Medication Sig Dispense Refill    Vyvanse 60 MG chewable tablet Chew 1 tablet Every Morning 30 tablet 0     No current facility-administered medications for this visit.         Review of Symptoms:    Psychiatric/Behavioral: Negative for agitation, behavioral problems, confusion, decreased concentration, dysphoric mood, hallucinations, self-injury, sleep disturbance and suicidal ideas.   The patient is not nervous/anxious and is not hyperactive.        Physical Exam:   There were no vitals taken for this visit.    Mental Status Exam:   Hygiene:   good  Cooperation:   Cooperative  Eye Contact:  Good  Psychomotor Behavior:  Appropriate  Affect:  Appropriate  Mood: normal  Hopelessness: Denies  Speech:  Normal  Thought Process:  Goal directed and Linear  Thought Content:  Normal  Suicidal:  None  Homicidal:  None  Hallucinations:  None  Delusion:  None  Memory:  Intact  Orientation:  Person, Place, Time and Situation  Reliability:  good  Insight:  Good  Judgement:  Good  Impulse Control:  Good  Physical/Medical Issues:  No      MSE from 5/14/24      was reviewed and no changes necessary      PHQ-9 Depression Screening  Little interest or pleasure in doing things? 0-->not at all   Feeling down, depressed, or hopeless? 0-->not at all   Trouble falling or staying asleep, or sleeping too much? 0-->not at all   Feeling tired or having little energy? 0-->not at all   Poor appetite or overeating? 0-->not at all   Feeling bad about yourself - or that you are a failure or have let yourself or your family down? 0-->not at all   Trouble concentrating on things, such as reading the newspaper or watching television? 2-->more than half the days   Moving or speaking so slowly that other people could have noticed? Or the opposite - being so fidgety or restless that you have been moving around a lot more than usual? 0-->not at all   Thoughts that you would be better off dead, or of hurting yourself in some way? 0-->not at all   PHQ-9 Total Score 2   If you checked off any problems, how difficult have these problems made it for you to do your work, take care of things at home, or get along with other people? somewhat difficult           Never smoker    I advised Annalisa of the risks of tobacco use.     Lab Results:   No visits with results within 3 Month(s) from this visit.   Latest known visit with results is:   Office Visit on 05/14/2024   Component Date Value Ref Range Status    Report Summary 05/14/2024 FINAL   Final    Comment:  ====================================================================  Amphetamines, MS, Ur RFX  ToxAssure Flex 22, Ur w/DL  ====================================================================  Test                             Result       Flag       Units  Drug Present    Amphetamine                    1459                    ng/mg creat     Amphetamine is available as a schedule II prescription drug.  ====================================================================  Test                      Result    Flag   Units      Ref Range    Creatinine              46               mg/dL      >=20  ====================================================================  Declared Medications:   Medication list was not provided.  ====================================================================  For clinical consultation, please call [phone].  ====================================================================      CREATININE 05/14/2024 46  mg/dL Final    REFERENCE RANGE: Ref Range>=20    Amphetamines, IA 05/14/2024 Comment  CUTOFF:300 ng/mL Final    Further testing indicated    Benzodiazepines 05/14/2024 Negative   Final    Diazepam Urine, Qualitative 05/14/2024 Not Detected  ng/mg creat Final    Desmethyldiazepam 05/14/2024 Not Detected  ng/mg creat Final    Oxazepam, urine 05/14/2024 Not Detected  ng/mg creat Final    Temazepam 05/14/2024 Not Detected  ng/mg creat Final    Comment: Expected metabolism of benzodiazepine class drugs:   Parent Drug       Detected Metabolites   -----------       --------------------   Diazepam:         Desmethyldiazepam, Temazepam, Oxazepam   Chlordiazepoxide: Desmethyldiazepam, Oxazepam   Clorazepate:      Desmethyldiazepam, Oxazepam   Halazepam:        Desmethyldiazepam, Oxazepam   Temazepam:        Oxazepam   Oxazepam:         None      Alprazolam Urine, Conf 05/14/2024 Not Detected  ng/mg creat Final    Alpha-hydroxyalprazolam, Urine 05/14/2024 Not Detected  ng/mg creat Final     Desalkylflurazepam, Urine 05/14/2024 Not Detected  ng/mg creat Final    Lorazepam, Urine 05/14/2024 Not Detected  ng/mg creat Final    Alpha-hydroxytriazolam, Urine 05/14/2024 Not Detected  ng/mg creat Final    Clonazepam 05/14/2024 Not Detected  ng/mg creat Final    7- AMINOCLONAZEPAM 05/14/2024 Not Detected  ng/mg creat Final    Midazolam, Urine 05/14/2024 Not Detected  ng/mg creat Final    Alpha-hydroxymidazolam, Urine 05/14/2024 Not Detected  ng/mg creat Final    Flunitrazepam 05/14/2024 Not Detected  ng/mg creat Final    DESMETHYLFLUNITRAZEPAM 05/14/2024 Not Detected  ng/mg creat Final    COCAINE / METABOLITE, IA 05/14/2024 Negative  CUTOFF:150 ng/mL Final    Ethanol and Ethanol Biomarkers 05/14/2024 Negative  CUTOFF:500 ng/mL Final    Cannavinoids IA 05/14/2024 Negative  CUTOFF:20 ng/mL Final    6-Acetylmorphine IA 05/14/2024 Negative  CUTOFF:10 ng/mL Final    Opiate Class IA 05/14/2024 Negative  CUTOFF:100 ng/mL Final    Oxycodone Class IA 05/14/2024 Negative  CUTOFF:100 ng/mL Final    METHADONE, IA 05/14/2024 Negative  CUTOFF:100 ng/mL Final    Methadone MTB IA 05/14/2024 Negative  CUTOFF:100 ng/mL Final    BUPRENORPHINE IA 05/14/2024 Negative  CUTOFF:5.0 ng/mL Final    FENTANYL, IA 05/14/2024 Negative  CUTOFF:2.0 ng/mL Final    Tapentadol, IA 05/14/2024 Negative  CUTOFF:200 ng/mL Final    PROPOXYPHENE, IA 05/14/2024 Negative  CUTOFF:300 ng/mL Final    TRAMADOL, IA 05/14/2024 Negative  CUTOFF:200 ng/mL Final    METHYLPHENIDATE IA 05/14/2024 Negative  CUTOFF:100 ng/mL Final    Barbiturates, IA 05/14/2024 Negative  CUTOFF:200 ng/mL Final    PHENCYCLIDINE, IA 05/14/2024 Negative  CUTOFF:25 ng/mL Final    ANTICONVULSANTS 05/14/2024 Negative   Final    Pregabalin 05/14/2024 Not Detected   Final    Gabapentin, IA 05/14/2024 Negative  CUTOFF:1.0 ug/mL Final    Carisoprodol, IA 05/14/2024 Negative  CUTOFF:100 ng/mL Final    Antidepressants 05/14/2024 Negative   Final    Amitriptyline 05/14/2024 Not Detected    Final    Amoxapine 05/14/2024 Not Detected   Final    8-Hydroxyamoxapine, Ur 05/14/2024 Not Detected   Final    Bupropion, Ur 05/14/2024 Not Detected   Final    Hydroxybupropion 05/14/2024 Not Detected   Final    Citalopram 05/14/2024 Not Detected   Final    Desmethylcitalopram 05/14/2024 Not Detected   Final    Clomipramine, Ur 05/14/2024 Not Detected   Final    Desmethylclomipramine 05/14/2024 Not Detected   Final    Desipramine 05/14/2024 Not Detected   Final    Doxepin 05/14/2024 Not Detected   Final    Desmethyldoxepin, Ur 05/14/2024 Not Detected   Final    Duloxetine, Ur 05/14/2024 Not Detected   Final    Fluoxetine, Ur 05/14/2024 Not Detected   Final    Norfluoxetine, Ur 05/14/2024 Not Detected   Final    Fluvoxamine 05/14/2024 Not Detected   Final    Imipramine 05/14/2024 Not Detected   Final    Mirtazapine 05/14/2024 Not Detected   Final    Nortriptyline 05/14/2024 Not Detected   Final    Paroxetine 05/14/2024 Not Detected   Final    Protriptyline 05/14/2024 Not Detected   Final    Sertraline, Ur 05/14/2024 Not Detected   Final    Desmethylsertraline 05/14/2024 Not Detected   Final    Maprotiline 05/14/2024 Not Detected   Final    Nefazodone, Ur 05/14/2024 Not Detected   Final    Trazodone 05/14/2024 Not Detected   Final    1,3 chlorophenyl piperazine 05/14/2024 Not Detected   Final    Trimipramine 05/14/2024 Not Detected   Final    Venlafaxine 05/14/2024 Not Detected   Final    Desmethylvenlafaxine, Ur 05/14/2024 Not Detected   Final    Vilazodone, Ur 05/14/2024 Not Detected   Final    Antipsychotics, Ur 05/14/2024 Negative   Final    Chlorpromazine 05/14/2024 Not Detected   Final    Clozapine, Ur 05/14/2024 Not Detected   Final    Desmethylclozapine, Ur 05/14/2024 Not Detected   Final    Loxapine, Ur 05/14/2024 Not Detected   Final    8-Hydroxyloxapine 05/14/2024 Not Detected   Final    Mesoridazine 05/14/2024 Not Detected   Final    Olanzapine 05/14/2024 Not Detected   Final    Quetiapine 05/14/2024 Not  Detected   Final    Risperidone 05/14/2024 Not Detected   Final    Fluphenazine 05/14/2024 Not Detected   Final    Haloperidol 05/14/2024 Not Detected   Final    THIORIDAZINE, UR 05/14/2024 Not Detected   Final    Molindone, Ur 05/14/2024 Not Detected   Final    Pimozide, Ur 05/14/2024 Not Detected   Final    Prochlorperazine, Ur 05/14/2024 Not Detected   Final    Thiothixene 05/14/2024 Not Detected   Final    Trifluoperazine 05/14/2024 Not Detected   Final    Ziprasidone 05/14/2024 Not Detected   Final    Perphenazine, Ur 05/14/2024 Not Detected   Final    Aripiprazole 05/14/2024 Not Detected   Final    Asenapine 05/14/2024 Not Detected   Final    Iloperidone 05/14/2024 Not Detected   Final    Lurasidone 05/14/2024 Not Detected   Final    KRATOM IA 05/14/2024 Negative  CUTOFF:5.0 ng/mL Final    Amphetamines Confirmation 05/14/2024 +POSITIVE+   Final    METHAMPHETAMINE 05/14/2024 Not Detected  ng/mg creat Final    AMPHETAMINE 05/14/2024 1,459  ng/mg creat Final    MDMA-Ecstasy 05/14/2024 Not Detected  ng/mg creat Final    MDA 05/14/2024 Not Detected  ng/mg creat Final       Assessment & Plan   Problems Addressed this Visit       ADHD, predominantly inattentive type - Primary (Chronic)     Diagnoses         Codes Comments    ADHD, predominantly inattentive type    -  Primary ICD-10-CM: F90.0  ICD-9-CM: 314.00             Visit Diagnoses:    ICD-10-CM ICD-9-CM   1. ADHD, predominantly inattentive type  F90.0 314.00               TREATMENT PLAN/GOALS: Continue supportive psychotherapy efforts and medications as indicated. Treatment and medication options discussed during today's visit. Patient ackowledged and verbally consented to continue with current treatment plan and was educated on the importance of compliance with treatment and follow-up appointments.    MEDICATION ISSUES:  1. ADHD inattentive type -  Cont vyvanse chewable  60 mg ,    long term stimulant use discussed , the pt is using for work only , her job  requires concentration  No changes necessary , UDS consistent     2.  long term therapeutic drug monitoring - 12/2/2022 consistent, 8/12/2022 consistent , 5/17/23 - consistent    LABORATORY - SCAN - ABBREVIATED URINE DRUG SCREEN. [facility], 05/17/2023 (05/17/2023) ,   UDA 5/15/24 - consistent   ToxAssure Flex 22, Ur w/DL - Urine, Random Void (05/14/2024 15:45)      INSPECT reviewed as expected - 8/28/24  - vyvanse last refill            PHQ scored 2 and indicated mild  Depression (mainly due to physical illness - viral)    MANPREET 7 scored 0   Patient screened positive for depression based on a PHQ-9 score of 2 on 9/12/2024. Follow-up recommendations include:  no indications for antidepressants  .    Discussed medication options and treatment plan of prescribed medication as well as the risks, benefits, and side effects including potential falls, possible impaired driving and metabolic adversities among others. Patient is agreeable to call the office with any worsening of symptoms or onset of side effects. Patient is agreeable to call 911 or go to the nearest ER should he/she begin having SI/HI. No medication side effects or related complaints today.     MEDS ORDERED DURING VISIT:  No orders of the defined types were placed in this encounter.   The pt just filled on 8/28/24, is going to be out of town when next refill is due     Return in about 4 months (around 1/12/2025).          This document has been electronically signed by Patt Perry MD  September 12, 2024 14:06 EDT

## 2024-09-23 DIAGNOSIS — F90.0 ADHD, PREDOMINANTLY INATTENTIVE TYPE: ICD-10-CM

## 2024-09-24 RX ORDER — LISDEXAMFETAMINE DIMESYLATE 60 MG/1
60 TABLET, CHEWABLE ORAL EVERY MORNING
Qty: 30 TABLET | Refills: 0 | Status: SHIPPED | OUTPATIENT
Start: 2024-09-24

## 2024-10-15 ENCOUNTER — TELEPHONE (OUTPATIENT)
Dept: PSYCHIATRY | Facility: CLINIC | Age: 39
End: 2024-10-15
Payer: COMMERCIAL

## 2024-10-15 DIAGNOSIS — F90.0 ADHD, PREDOMINANTLY INATTENTIVE TYPE: ICD-10-CM

## 2024-10-15 NOTE — TELEPHONE ENCOUNTER
Pt says she had spoken to you about putting a note on her Vyvanse that it is okay to fill early on the 23rd, because she is going on vacation.    Pt would like it sent to [facility] in [place] again; last fill 9-27-24; UPLOADED.

## 2024-10-16 RX ORDER — LISDEXAMFETAMINE DIMESYLATE 60 MG/1
60 TABLET, CHEWABLE ORAL EVERY MORNING
Qty: 30 TABLET | Refills: 0 | Status: SHIPPED | OUTPATIENT
Start: 2024-10-16

## 2024-11-20 DIAGNOSIS — F90.0 ADHD, PREDOMINANTLY INATTENTIVE TYPE: ICD-10-CM

## 2024-11-20 RX ORDER — LISDEXAMFETAMINE DIMESYLATE 60 MG/1
60 TABLET, CHEWABLE ORAL EVERY MORNING
Qty: 30 TABLET | Refills: 0 | Status: SHIPPED | OUTPATIENT
Start: 2024-11-20

## 2024-11-20 NOTE — TELEPHONE ENCOUNTER
Rx Refill Note  Requested Prescriptions     Pending Prescriptions Disp Refills    Vyvanse 60 MG chewable tablet 30 tablet 0     Sig: Chew 1 tablet Every Morning      Last office visit with prescribing clinician: 9/12/2024   Last telemedicine visit with prescribing clinician: Visit date not found   Next office visit with prescribing clinician: 1/14/2025   Office Visit with Patt Perry MD (09/12/2024)   ToxAssure Flex 22, Ur w/DL - Urine, Random Void (05/14/2024 15:45)                     Would you like a call back once the refill request has been completed: [] Yes [] No    If the office needs to give you a call back, can they leave a voicemail: [] Yes [] No    Paris Manzo MA  11/20/24, 09:16 EST    LAST FILL 10-24-24; PT SAYS IN STOCK AT [facility] [place]; UPLOADED

## 2024-12-17 DIAGNOSIS — F90.0 ADHD, PREDOMINANTLY INATTENTIVE TYPE: ICD-10-CM

## 2024-12-17 NOTE — TELEPHONE ENCOUNTER
Rx Refill Note  Requested Prescriptions     Pending Prescriptions Disp Refills    Vyvanse 60 MG chewable tablet 30 tablet 0     Sig: Chew 1 tablet Every Morning      Last office visit with prescribing clinician: 9/12/2024   Last telemedicine visit with prescribing clinician: Visit date not found   Next office visit with prescribing clinician: 1/14/2025   Office Visit with Patt Perry MD (09/12/2024)   ToxAssure Flex 22, Ur w/DL - Urine, Random Void (05/14/2024 15:45)                     Would you like a call back once the refill request has been completed: [] Yes [] No    If the office needs to give you a call back, can they leave a voicemail: [] Yes [] No    Paris Manzo MA  12/17/24, 15:53 EST    LAST FILL 11-24-24; PT SAYS IN STOCK AT [facility] [place]; UPLOADED

## 2024-12-18 RX ORDER — LISDEXAMFETAMINE DIMESYLATE 60 MG/1
60 TABLET, CHEWABLE ORAL EVERY MORNING
Qty: 30 TABLET | Refills: 0 | Status: SHIPPED | OUTPATIENT
Start: 2024-12-18

## 2025-01-14 ENCOUNTER — OFFICE VISIT (OUTPATIENT)
Dept: PSYCHIATRY | Facility: CLINIC | Age: 40
End: 2025-01-14
Payer: COMMERCIAL

## 2025-01-14 DIAGNOSIS — F90.0 ADHD, PREDOMINANTLY INATTENTIVE TYPE: Primary | Chronic | ICD-10-CM

## 2025-01-14 PROCEDURE — 99213 OFFICE O/P EST LOW 20 MIN: CPT | Performed by: PSYCHIATRY & NEUROLOGY

## 2025-01-14 PROCEDURE — 96127 BRIEF EMOTIONAL/BEHAV ASSMT: CPT | Performed by: PSYCHIATRY & NEUROLOGY

## 2025-01-14 RX ORDER — LISDEXAMFETAMINE DIMESYLATE 60 MG/1
60 TABLET, CHEWABLE ORAL EVERY MORNING
Qty: 30 TABLET | Refills: 0 | Status: SHIPPED | OUTPATIENT
Start: 2025-01-14

## 2025-01-14 NOTE — PROGRESS NOTES
Subjective   Annalisa Fuentes is a 39 y.o. female who presents today for follow up        Chief Complaint:   To f/u on decreased concentration , to refill meds to maintain stability      History of Present Illness:     Long history of ADHD,  Was  on different meds in the past, was stable on current meds     Today the pt reported no major problems in general,   Now feels slightly more depressed 2ry to family losses (5 deaths in 4 months)   The pt remains stable on meds, stay busy at work, productive, stays on task,   Improved task completion  Anxiety is manageable, no panic attacks    Denied AVH/SI/HI  Concentration is good on meds, tolerates well,   Her job requires concentration, taking meds for work only   No procrastination    No side effects , no chest discomfort    No irritability , no manic episodes     Precipitating and Ameliorating Factors: job related stress  alleviating factors - to spend time with baby animals (suirrels)   Dogs         PAST PSYCHIATRIC HISTORY      Previous Psychiatric Diagnoses   Axis I: Attention Deficit Disorder     Past Hospitalizations or Residential Treatment   Locations\Providers: none      Past Outpatient Treatment   Diagnosis Treated: ADD  Location: PCP      Prior Psychiatric Medications   Comments: adderall - skin picking       Consequences of Mental Disorder   Consequences: emotional distress     SOCIAL HISTORY     Number of marriages: 1  Number of children: 0  Current Relationship is: supportive  Family of Origin is: abusive     Current Living Situation   Lives with: spouse     Education   Level: some college     Employment   Job Status: full-time     Hobbies and Leisure Activities   Activity Type: quiet activities     Alcohol use   Freq. drinkin drink  Smoking History   Smoking Hx: Never smoker     Exercise   Exercise sessions (per wk): 1     Illicit Drug Use   Illicit Drugs used: none     FAMILY HISTORY OF MENTAL DISORDERS   fh Grandparents: Non-contributory  fh  Mother: Non-contributory  fh Father: Addictive Disorders - Alcoholism  fh Siblings: Non-contributory  fh Other: Non-contributory     The following portions of the patient's history were reviewed and updated as appropriate: allergies, current medications, past family history, past medical history, past social history, past surgical history and problem list.            Interval History  No Change - stable     Side Effects  Denied       Past Medical History:  Past Medical History:   Diagnosis Date    ADHD (attention deficit hyperactivity disorder)        Social History:  Social History     Socioeconomic History    Marital status:    Tobacco Use    Smoking status: Never    Smokeless tobacco: Never   Vaping Use    Vaping status: Never Used   Substance and Sexual Activity    Alcohol use: Yes     Comment: socially    Drug use: No    Sexual activity: Defer       Family History:  History reviewed. No pertinent family history.    Past Surgical History:  History reviewed. No pertinent surgical history.    Problem List:  Patient Active Problem List   Diagnosis    ADHD, predominantly inattentive type    Encounter for long-term (current) use of other medications       Allergy:   Allergies   Allergen Reactions    Amoxicillin Nausea And Vomiting        Discontinued Medications:  Medications Discontinued During This Encounter   Medication Reason    Vyvanse 60 MG chewable tablet Reorder               Current Medications:   Current Outpatient Medications   Medication Sig Dispense Refill    Vyvanse 60 MG chewable tablet Chew 1 tablet Every Morning 30 tablet 0     No current facility-administered medications for this visit.         Review of Symptoms:    Psychiatric/Behavioral: Negative for agitation, behavioral problems, confusion, decreased concentration, dysphoric mood, hallucinations, self-injury, sleep disturbance and suicidal ideas.   The patient is not nervous/anxious and is not hyperactive.        Physical Exam:   There  were no vitals taken for this visit.    Mental Status Exam:   Hygiene:   good  Cooperation:  Cooperative  Eye Contact:  Good  Psychomotor Behavior:  Appropriate  Affect:  Appropriate  Mood: normal  Hopelessness: Denies  Speech:  Normal  Thought Process:  Goal directed and Linear  Thought Content:  Normal  Suicidal:  None  Homicidal:  None  Hallucinations:  None  Delusion:  None  Memory:  Intact  Orientation:  Person, Place, Time and Situation  Reliability:  good  Insight:  Good  Judgement:  Good  Impulse Control:  Good  Physical/Medical Issues:  No      MSE from 9/12/24      was reviewed and no changes necessary    PHQ-9 Depression Screening  Little interest or pleasure in doing things? Several days   Feeling down, depressed, or hopeless? Over half   PHQ-2 Total Score 3   Trouble falling or staying asleep, or sleeping too much? Several days   Feeling tired or having little energy? Several days   Poor appetite or overeating? Not at all   Feeling bad about yourself - or that you are a failure or have let yourself or your family down? Not at all   Trouble concentrating on things, such as reading the newspaper or watching television? Over half   Moving or speaking so slowly that other people could have noticed? Or the opposite - being so fidgety or restless that you have been moving around a lot more than usual? Not at all   Thoughts that you would be better off dead, or of hurting yourself in some way? Not at all   PHQ-9 Total Score 7   If you checked off any problems, how difficult have these problems made it for you to do your work, take care of things at home, or get along with other people? Somewhat difficult    Over the last two weeks, how often have you been bothered by the following problems?  Feeling nervous, anxious or on edge: Several days  Not being able to stop or control worrying: Not at all  Worrying too much about different things: Not at all  Trouble Relaxing: Not at all  Being so restless that it is  hard to sit still: Not at all  Becoming easily annoyed or irritable: Not at all  Feeling afraid as if something awful might happen: Several days  MANPREET 7 Total Score: 2  If you checked any problems, how difficult have these problems made it for you to do your work, take care of things at home, or get along with other people: Somewhat difficult           Never smoker    I advised Annalisa of the risks of tobacco use.     Lab Results:   No visits with results within 3 Month(s) from this visit.   Latest known visit with results is:   Office Visit on 05/14/2024   Component Date Value Ref Range Status    Report Summary 05/14/2024 FINAL   Final    Comment: ====================================================================  Amphetamines, MS, Ur RFX  ToxAssure Flex 22, Ur w/DL  ====================================================================  Test                             Result       Flag       Units  Drug Present    Amphetamine                    1459                    ng/mg creat     Amphetamine is available as a schedule II prescription drug.  ====================================================================  Test                      Result    Flag   Units      Ref Range    Creatinine              46               mg/dL      >=20  ====================================================================  Declared Medications:   Medication list was not provided.  ====================================================================  For clinical consultation, please call [phone].  ====================================================================      CREATININE 05/14/2024 46  mg/dL Final    REFERENCE RANGE: Ref Range>=20    Amphetamines, IA 05/14/2024 Comment  CUTOFF:300 ng/mL Final    Further testing indicated    Benzodiazepines 05/14/2024 Negative   Final    Diazepam Urine, Qualitative 05/14/2024 Not Detected  ng/mg creat Final    Desmethyldiazepam 05/14/2024 Not Detected  ng/mg creat Final    Oxazepam,  urine 05/14/2024 Not Detected  ng/mg creat Final    Temazepam 05/14/2024 Not Detected  ng/mg creat Final    Comment: Expected metabolism of benzodiazepine class drugs:   Parent Drug       Detected Metabolites   -----------       --------------------   Diazepam:         Desmethyldiazepam, Temazepam, Oxazepam   Chlordiazepoxide: Desmethyldiazepam, Oxazepam   Clorazepate:      Desmethyldiazepam, Oxazepam   Halazepam:        Desmethyldiazepam, Oxazepam   Temazepam:        Oxazepam   Oxazepam:         None      Alprazolam Urine, Conf 05/14/2024 Not Detected  ng/mg creat Final    Alpha-hydroxyalprazolam, Urine 05/14/2024 Not Detected  ng/mg creat Final    Desalkylflurazepam, Urine 05/14/2024 Not Detected  ng/mg creat Final    Lorazepam, Urine 05/14/2024 Not Detected  ng/mg creat Final    Alpha-hydroxytriazolam, Urine 05/14/2024 Not Detected  ng/mg creat Final    Clonazepam 05/14/2024 Not Detected  ng/mg creat Final    7- AMINOCLONAZEPAM 05/14/2024 Not Detected  ng/mg creat Final    Midazolam, Urine 05/14/2024 Not Detected  ng/mg creat Final    Alpha-hydroxymidazolam, Urine 05/14/2024 Not Detected  ng/mg creat Final    Flunitrazepam 05/14/2024 Not Detected  ng/mg creat Final    DESMETHYLFLUNITRAZEPAM 05/14/2024 Not Detected  ng/mg creat Final    COCAINE / METABOLITE, IA 05/14/2024 Negative  CUTOFF:150 ng/mL Final    Ethanol and Ethanol Biomarkers 05/14/2024 Negative  CUTOFF:500 ng/mL Final    Cannavinoids IA 05/14/2024 Negative  CUTOFF:20 ng/mL Final    6-Acetylmorphine IA 05/14/2024 Negative  CUTOFF:10 ng/mL Final    Opiate Class IA 05/14/2024 Negative  CUTOFF:100 ng/mL Final    Oxycodone Class IA 05/14/2024 Negative  CUTOFF:100 ng/mL Final    METHADONE, IA 05/14/2024 Negative  CUTOFF:100 ng/mL Final    Methadone MTB IA 05/14/2024 Negative  CUTOFF:100 ng/mL Final    BUPRENORPHINE IA 05/14/2024 Negative  CUTOFF:5.0 ng/mL Final    FENTANYL, IA 05/14/2024 Negative  CUTOFF:2.0 ng/mL Final    Tapentadol, IA 05/14/2024 Negative   CUTOFF:200 ng/mL Final    PROPOXYPHENE, IA 05/14/2024 Negative  CUTOFF:300 ng/mL Final    TRAMADOL, IA 05/14/2024 Negative  CUTOFF:200 ng/mL Final    METHYLPHENIDATE IA 05/14/2024 Negative  CUTOFF:100 ng/mL Final    Barbiturates, IA 05/14/2024 Negative  CUTOFF:200 ng/mL Final    PHENCYCLIDINE, IA 05/14/2024 Negative  CUTOFF:25 ng/mL Final    ANTICONVULSANTS 05/14/2024 Negative   Final    Pregabalin 05/14/2024 Not Detected   Final    Gabapentin, IA 05/14/2024 Negative  CUTOFF:1.0 ug/mL Final    Carisoprodol, IA 05/14/2024 Negative  CUTOFF:100 ng/mL Final    Antidepressants 05/14/2024 Negative   Final    Amitriptyline 05/14/2024 Not Detected   Final    Amoxapine 05/14/2024 Not Detected   Final    8-Hydroxyamoxapine, Ur 05/14/2024 Not Detected   Final    Bupropion, Ur 05/14/2024 Not Detected   Final    Hydroxybupropion 05/14/2024 Not Detected   Final    Citalopram 05/14/2024 Not Detected   Final    Desmethylcitalopram 05/14/2024 Not Detected   Final    Clomipramine, Ur 05/14/2024 Not Detected   Final    Desmethylclomipramine 05/14/2024 Not Detected   Final    Desipramine 05/14/2024 Not Detected   Final    Doxepin 05/14/2024 Not Detected   Final    Desmethyldoxepin, Ur 05/14/2024 Not Detected   Final    Duloxetine, Ur 05/14/2024 Not Detected   Final    Fluoxetine, Ur 05/14/2024 Not Detected   Final    Norfluoxetine, Ur 05/14/2024 Not Detected   Final    Fluvoxamine 05/14/2024 Not Detected   Final    Imipramine 05/14/2024 Not Detected   Final    Mirtazapine 05/14/2024 Not Detected   Final    Nortriptyline 05/14/2024 Not Detected   Final    Paroxetine 05/14/2024 Not Detected   Final    Protriptyline 05/14/2024 Not Detected   Final    Sertraline, Ur 05/14/2024 Not Detected   Final    Desmethylsertraline 05/14/2024 Not Detected   Final    Maprotiline 05/14/2024 Not Detected   Final    Nefazodone, Ur 05/14/2024 Not Detected   Final    Trazodone 05/14/2024 Not Detected   Final    1,3 chlorophenyl piperazine 05/14/2024 Not  Detected   Final    Trimipramine 05/14/2024 Not Detected   Final    Venlafaxine 05/14/2024 Not Detected   Final    Desmethylvenlafaxine, Ur 05/14/2024 Not Detected   Final    Vilazodone, Ur 05/14/2024 Not Detected   Final    Antipsychotics, Ur 05/14/2024 Negative   Final    Chlorpromazine 05/14/2024 Not Detected   Final    Clozapine, Ur 05/14/2024 Not Detected   Final    Desmethylclozapine, Ur 05/14/2024 Not Detected   Final    Loxapine, Ur 05/14/2024 Not Detected   Final    8-Hydroxyloxapine 05/14/2024 Not Detected   Final    Mesoridazine 05/14/2024 Not Detected   Final    Olanzapine 05/14/2024 Not Detected   Final    Quetiapine 05/14/2024 Not Detected   Final    Risperidone 05/14/2024 Not Detected   Final    Fluphenazine 05/14/2024 Not Detected   Final    Haloperidol 05/14/2024 Not Detected   Final    THIORIDAZINE, UR 05/14/2024 Not Detected   Final    Molindone, Ur 05/14/2024 Not Detected   Final    Pimozide, Ur 05/14/2024 Not Detected   Final    Prochlorperazine, Ur 05/14/2024 Not Detected   Final    Thiothixene 05/14/2024 Not Detected   Final    Trifluoperazine 05/14/2024 Not Detected   Final    Ziprasidone 05/14/2024 Not Detected   Final    Perphenazine, Ur 05/14/2024 Not Detected   Final    Aripiprazole 05/14/2024 Not Detected   Final    Asenapine 05/14/2024 Not Detected   Final    Iloperidone 05/14/2024 Not Detected   Final    Lurasidone 05/14/2024 Not Detected   Final    KRATOM IA 05/14/2024 Negative  CUTOFF:5.0 ng/mL Final    Amphetamines Confirmation 05/14/2024 +POSITIVE+   Final    METHAMPHETAMINE 05/14/2024 Not Detected  ng/mg creat Final    AMPHETAMINE 05/14/2024 1,459  ng/mg creat Final    MDMA-Ecstasy 05/14/2024 Not Detected  ng/mg creat Final    MDA 05/14/2024 Not Detected  ng/mg creat Final       Assessment & Plan   Problems Addressed this Visit       ADHD, predominantly inattentive type - Primary (Chronic)    Relevant Medications    Vyvanse 60 MG chewable tablet     Diagnoses         Codes  Comments    ADHD, predominantly inattentive type    -  Primary ICD-10-CM: F90.0  ICD-9-CM: 314.00             Visit Diagnoses:    ICD-10-CM ICD-9-CM   1. ADHD, predominantly inattentive type  F90.0 314.00                 TREATMENT PLAN/GOALS: Continue supportive psychotherapy efforts and medications as indicated. Treatment and medication options discussed during today's visit. Patient ackowledged and verbally consented to continue with current treatment plan and was educated on the importance of compliance with treatment and follow-up appointments.    MEDICATION ISSUES:  1. ADHD inattentive type -  Cont vyvanse chewable  60 mg , no changes necessary    long term stimulant use discussed , the pt is using for work only , her job requires concentration  No signs of abuse/misuse, UDS consistent      2.  long term therapeutic drug monitoring - 12/2/2022 consistent, 8/12/2022 consistent , 5/17/23 - consistent    LABORATORY - SCAN - ABBREVIATED URINE DRUG SCREEN. [facility], 05/17/2023 (05/17/2023) ,   [facility]DA 5/15/24 - consistent   ToxAssure Flex 22, Ur w/DL - Urine, Random Void (05/14/2024 15:45)      INSPECT reviewed as expected - 12/18/24  - vyvanse last refill            PHQ scored 7 and indicated mild  Depression (mainly bereavement)     MANPREET 7 scored 2   Patient screened positive for depression based on a PHQ-9 score of 7 on 1/14/2025. Follow-up recommendations include:  no indications for antidepressants  .    Discussed medication options and treatment plan of prescribed medication as well as the risks, benefits, and side effects including potential falls, possible impaired driving and metabolic adversities among others. Patient is agreeable to call the office with any worsening of symptoms or onset of side effects. Patient is agreeable to call 911 or go to the nearest ER should he/she begin having SI/HI. No medication side effects or related complaints today.     MEDS ORDERED DURING VISIT:  New Medications Ordered This  Visit   Medications    Vyvanse 60 MG chewable tablet     Sig: Chew 1 tablet Every Morning     Dispense:  30 tablet     Refill:  0     Please dispense only when it is due        Return in about 4 months (around 5/14/2025).          This document has been electronically signed by Patt Perry MD  January 14, 2025 14:30 EST

## 2025-02-17 DIAGNOSIS — F90.0 ADHD, PREDOMINANTLY INATTENTIVE TYPE: Chronic | ICD-10-CM

## 2025-02-17 NOTE — TELEPHONE ENCOUNTER
Rx Refill Note  Requested Prescriptions     Pending Prescriptions Disp Refills    Vyvanse 60 MG chewable tablet 30 tablet 0     Sig: Chew 1 tablet Every Morning      Last office visit with prescribing clinician: 1/14/2025   Last telemedicine visit with prescribing clinician: Visit date not found   Next office visit with prescribing clinician: 5/6/2025   Office Visit with Patt Perry MD (01/14/2025)   ToxAssure Flex 22, Ur w/DL - Urine, Random Void (05/14/2024 15:45)                     Would you like a call back once the refill request has been completed: [] Yes [] No    If the office needs to give you a call back, can they leave a voicemail: [] Yes [] No    Paris Manzo MA  02/17/25, 14:32 EST    LAST FILL 1-22-25; PT SAYS IN STOCK AT [facility] [place]; UPLOADED

## 2025-02-19 RX ORDER — LISDEXAMFETAMINE DIMESYLATE 60 MG/1
60 TABLET, CHEWABLE ORAL EVERY MORNING
Qty: 30 TABLET | Refills: 0 | Status: SHIPPED | OUTPATIENT
Start: 2025-02-19

## 2025-03-13 ENCOUNTER — TELEPHONE (OUTPATIENT)
Dept: FAMILY MEDICINE CLINIC | Facility: CLINIC | Age: 40
End: 2025-03-13

## 2025-03-13 ENCOUNTER — OFFICE VISIT (OUTPATIENT)
Dept: FAMILY MEDICINE CLINIC | Facility: CLINIC | Age: 40
End: 2025-03-13
Payer: COMMERCIAL

## 2025-03-13 VITALS
WEIGHT: 150.4 LBS | RESPIRATION RATE: 18 BRPM | HEIGHT: 62 IN | DIASTOLIC BLOOD PRESSURE: 88 MMHG | OXYGEN SATURATION: 97 % | TEMPERATURE: 97.8 F | HEART RATE: 87 BPM | SYSTOLIC BLOOD PRESSURE: 122 MMHG | BODY MASS INDEX: 27.68 KG/M2

## 2025-03-13 DIAGNOSIS — R20.2 NUMBNESS AND TINGLING IN BOTH HANDS: ICD-10-CM

## 2025-03-13 DIAGNOSIS — R13.10 DYSPHAGIA, UNSPECIFIED TYPE: ICD-10-CM

## 2025-03-13 DIAGNOSIS — Z00.00 ANNUAL PHYSICAL EXAM: Primary | ICD-10-CM

## 2025-03-13 DIAGNOSIS — Z76.89 ENCOUNTER TO ESTABLISH CARE: ICD-10-CM

## 2025-03-13 DIAGNOSIS — R20.0 NUMBNESS AND TINGLING IN BOTH HANDS: ICD-10-CM

## 2025-03-13 DIAGNOSIS — Z23 NEED FOR TDAP VACCINATION: ICD-10-CM

## 2025-03-13 DIAGNOSIS — R03.0 ELEVATED BLOOD PRESSURE READING: ICD-10-CM

## 2025-03-13 DIAGNOSIS — Z11.59 NEED FOR HEPATITIS C SCREENING TEST: ICD-10-CM

## 2025-03-13 DIAGNOSIS — Z12.4 PAP SMEAR FOR CERVICAL CANCER SCREENING: ICD-10-CM

## 2025-03-13 NOTE — PROGRESS NOTES
Injection  Injection performed in left deltoid by Starla Johnson MA. Patient tolerated the procedure well without complications.  03/13/25   Starla Johnson MA

## 2025-03-13 NOTE — PROGRESS NOTES
Chief Complaint  Establish Care    Subjective        Annalisa Fuentes presents to [facility]  History of Present Illness    Patient presents today to establish care and for annual physical exam.  Last PCP with Dr. Lee.  Works for [employer] (bids/contracts) for past 9 years.  Enjoys farming, bee keeping (100 hives), and outdoor activities (biking). , no children. There is reported/documented history of ADHD, carpal tunnel syndrome, and vitamin D deficiency.    Specialty providers:  Psychiatry-follows with Dr. Perry   Gynecology- last visit with [facility] approximately 4 years ago. Hx ParaGard IUD.        Review of systems:   Constitutional: Negative for fatigue, fever, chills, weight change, night sweats, and trouble sleeping    Skin: Reported rash around mouth intermittently since August- thought pimples- waxes and wanes.  Negative for itching, growth/lesions, dryness, change in hair or nails, lumps, abnormal mole, color change, and moisture    Eyes: Negative for visual disturbance and dry eyes    HEENT: Positive post nasal drainage. Negative for hoarseness, nosebleeds, snoring, sneezing, vertigo, sore throat, dry mouth, dental/oral cavity problem, nasal discharge, sinus pain, earache, tinnitus, vertigo, and difficult hearing    Respiratory: Negative for shortness of breath, apnea, cough, sputum, and wheezing     Breast: Hx left breast biopsy age 19, hx breast augmentation age 25.  Negative for lumps or nipple discharge    Cardiovascular: Reported intermittent palpitations when stressed. Negative for chest pain, dizziness, fainting, and edema.     Gastrointestinal: Reported swallowing problem 1x/2weeks-recently felt like pill was stuck in throat.  Negative for nausea, vomiting, heartburn, change in appetite, abdominal pain, constipation, diarrhea, black, blood, or mucous in stool, and change in stool caliber.     Genitourinary: LMP 3/12/2025.   Negative for frequency, urgency, hesitancy, dysuria, incontinence, hematuria, hernia, vaginal discharge, vaginal dryness, itching, abnormal bleeding, dyspareunia, and pelvic pain     Musculoskeletal: Reported hx chiropractor for neck pain and right hip pain-sessions at [facility] in [place] for past 3 weeks which has been helpful. Negative for loss of strength, restricted movement, other joint pain, joint swelling, and muscle pain.     Neurological: Hx numbness/tingling in hands; seems to occur mostly at night after sustained position. Negative for headache, dizziness, weakness, memory change/loss, loss of consciousness, speech disturbance, tremor, unsteady gait, and restless legs    Hematologic/Lymphatic/Immunologic: Reported history of easy bruising. Negative for easy bleeding, lymph node enlargement, and recurrent infection     Endocrine: Negative for polyphagia, polydipsia, polyuria, cold intolerance, heat intolerance, weight change, and excessive sweating    Psychiatric: Negative for anxiety, depression, and attention problems         PHQ-9 Depression Screening  Little interest or pleasure in doing things? Not at all   Feeling down, depressed, or hopeless? Not at all   PHQ-2 Total Score 0   Trouble falling or staying asleep, or sleeping too much?     Feeling tired or having little energy?     Poor appetite or overeating?     Feeling bad about yourself - or that you are a failure or have let yourself or your family down?     Trouble concentrating on things, such as reading the newspaper or watching television?     Moving or speaking so slowly that other people could have noticed? Or the opposite - being so fidgety or restless that you have been moving around a lot more than usual?       Thoughts that you would be better off dead, or of hurting yourself in some way?     PHQ-9 Total Score     If you checked off any problems, how difficult have these problems made it for you to do your work, take care of  "things at home, or get along with other people? Not difficult at all              Objective   Vital Signs:  /88 (BP Location: Left arm, Patient Position: Sitting, Cuff Size: Adult)   Pulse 87   Temp 97.8 °F (36.6 °C) (Temporal)   Resp 18   Ht 157.5 cm (62\")   Wt 68.2 kg (150 lb 6.4 oz)   SpO2 97%   BMI 27.51 kg/m²   Estimated body mass index is 27.51 kg/m² as calculated from the following:    Height as of this encounter: 157.5 cm (62\").    Weight as of this encounter: 68.2 kg (150 lb 6.4 oz).          Physical Exam  Vitals and nursing note reviewed.   Constitutional:       General: She is not in acute distress.     Appearance: Normal appearance. She is well-developed and well-groomed.      Comments: Pleasant, converses appropriately     HENT:      Head: Normocephalic and atraumatic.      Right Ear: Hearing, tympanic membrane, ear canal and external ear normal.      Left Ear: Hearing, tympanic membrane, ear canal and external ear normal.      Nose: Nose normal.      Mouth/Throat:      Lips: Pink.      Mouth: Mucous membranes are moist.      Pharynx: Oropharynx is clear.   Eyes:      Conjunctiva/sclera: Conjunctivae normal.   Neck:      Thyroid: No thyromegaly.   Cardiovascular:      Rate and Rhythm: Normal rate and regular rhythm.      Chest Wall: PMI is not displaced.      Pulses: Normal pulses.      Heart sounds: Normal heart sounds, S1 normal and S2 normal.   Pulmonary:      Effort: Pulmonary effort is normal.      Breath sounds: Normal breath sounds.   Abdominal:      General: Bowel sounds are normal.      Palpations: Abdomen is soft.      Tenderness: There is no abdominal tenderness.   Musculoskeletal:         General: Normal range of motion.      Cervical back: Normal range of motion and neck supple.      Right lower leg: No edema.      Left lower leg: No edema.      Comments: Bilateral upper and lower extremity strength normal.   Lymphadenopathy:      Cervical: No cervical adenopathy.      Upper " Body:      Right upper body: No supraclavicular adenopathy.      Left upper body: No supraclavicular adenopathy.   Skin:     General: Skin is warm and dry.      Findings: No rash.   Neurological:      Mental Status: She is alert and oriented to person, place, and time.      Cranial Nerves: Cranial nerves 2-12 are intact.      Motor: Motor function is intact.      Gait: Gait is intact.   Psychiatric:         Attention and Perception: Attention normal.         Mood and Affect: Mood and affect normal.         Speech: Speech normal.         Behavior: Behavior normal.         Thought Content: Thought content normal.         Judgment: Judgment normal.      Comments: Dressed appropriately.         Result Review :                Assessment and Plan   Diagnoses and all orders for this visit:    1. Annual physical exam (Primary)  Assessment & Plan:  Discussed injury prevention, diet and exercise, safe sexual practices, and screening for common diseases. Encouraged use of sunscreen and seatbelts. Discussed timing of breast cancer screening, cervical cancer screening, colon cancer screening, and review of skin for lesions. Avoidance of tobacco encouraged. Limitation or avoidance of alcohol encouraged. Recommend yearly eye and dental exams. Also discussed monitoring of blood pressure and lipids.   Recommended daily vitamin D supplement over-the-counter.    Orders:  -     CBC & Differential; Future  -     Comprehensive Metabolic Panel; Future  -     Urinalysis With Culture If Indicated -; Future  -     Lipid Panel; Future    2. Encounter to establish care  -     CBC & Differential; Future  -     Comprehensive Metabolic Panel; Future  -     Hemoglobin A1c; Future  -     Urinalysis With Culture If Indicated -; Future  -     TSH Rfx On Abnormal To Free T4; Future  -     Lipid Panel; Future    3. Pap smear for cervical cancer screening  -     Ambulatory Referral to Gynecology    4. Numbness and tingling in both hands  -     Vitamin  B12; Future    5. Need for hepatitis C screening test  -     Hepatitis C Antibody; Future    6. Dysphagia, unspecified type  Assessment & Plan:  -Recommended trial antihistamine OTC such as Claritin (loratadine), Zyrtec (cetirizine) , or Allegra (fexofenadine) daily.   -Recommended trial Pepcid 20 mg (famotidine) twice daily OTC.   -Follow-up if symptoms persist or worsen.    Orders:  -     CBC & Differential; Future  -     Comprehensive Metabolic Panel; Future    7. Need for Tdap vaccination  -     Tdap Vaccine => 8yo IM (BOOSTRIX/ADACEL)    8. Elevated blood pressure reading  Comments:  Discussed elevated diastolic blood pressure today.  Will monitor.             Follow Up   Return in about 1 year (around 3/13/2026) for Annual physical.  Patient was given instructions and counseling regarding her condition or for health maintenance advice. Please see specific information pulled into the AVS if appropriate.

## 2025-03-13 NOTE — ASSESSMENT & PLAN NOTE
-Recommended trial antihistamine OTC such as Claritin (loratadine), Zyrtec (cetirizine) , or Allegra (fexofenadine) daily.   -Recommended trial Pepcid 20 mg (famotidine) twice daily OTC.   -Follow-up if symptoms persist or worsen.

## 2025-03-13 NOTE — TELEPHONE ENCOUNTER
Caller: Annalisa Fuentes    Relationship: Self    Best call back number: [phone]     Do you know the name of the person who called: PT STATES THAT HER GYNO REFERRAL NEEDS TO BE SENT TO A DIFFERENT PROVIDER. UNSURE OF NAME BUT KNOWS THEY WERE ACROSS FROM [facility].

## 2025-03-13 NOTE — ASSESSMENT & PLAN NOTE
Discussed injury prevention, diet and exercise, safe sexual practices, and screening for common diseases. Encouraged use of sunscreen and seatbelts. Discussed timing of breast cancer screening, cervical cancer screening, colon cancer screening, and review of skin for lesions. Avoidance of tobacco encouraged. Limitation or avoidance of alcohol encouraged. Recommend yearly eye and dental exams. Also discussed monitoring of blood pressure and lipids.   Recommended daily vitamin D supplement over-the-counter.

## 2025-03-13 NOTE — PATIENT INSTRUCTIONS
Trial antihistamine OTC such as Claritin (loratadine), Zyrtec (cetirizine) , or Allegra (fexofenadine) daily.   Trial Pepcid 20 mg (famotidine) twice daily OTC.

## 2025-03-14 NOTE — TELEPHONE ENCOUNTER
[facility]  LEFT MESSAGE FOR PT ON CELL VM

## 2025-03-18 ENCOUNTER — LAB (OUTPATIENT)
Dept: FAMILY MEDICINE CLINIC | Facility: CLINIC | Age: 40
End: 2025-03-18
Payer: COMMERCIAL

## 2025-03-18 DIAGNOSIS — R13.10 DYSPHAGIA, UNSPECIFIED TYPE: ICD-10-CM

## 2025-03-18 DIAGNOSIS — F90.0 ADHD, PREDOMINANTLY INATTENTIVE TYPE: Chronic | ICD-10-CM

## 2025-03-18 DIAGNOSIS — R20.2 NUMBNESS AND TINGLING IN BOTH HANDS: ICD-10-CM

## 2025-03-18 DIAGNOSIS — R20.0 NUMBNESS AND TINGLING IN BOTH HANDS: ICD-10-CM

## 2025-03-18 DIAGNOSIS — Z00.00 ANNUAL PHYSICAL EXAM: ICD-10-CM

## 2025-03-18 DIAGNOSIS — Z11.59 NEED FOR HEPATITIS C SCREENING TEST: ICD-10-CM

## 2025-03-18 DIAGNOSIS — Z76.89 ENCOUNTER TO ESTABLISH CARE: ICD-10-CM

## 2025-03-18 LAB
ALBUMIN SERPL-MCNC: 4.7 G/DL (ref 3.5–5.2)
ALBUMIN/GLOB SERPL: 1.6 G/DL
ALP SERPL-CCNC: 72 U/L (ref 39–117)
ALT SERPL W P-5'-P-CCNC: 14 U/L (ref 1–33)
ANION GAP SERPL CALCULATED.3IONS-SCNC: 11 MMOL/L (ref 5–15)
AST SERPL-CCNC: 20 U/L (ref 1–32)
BASOPHILS # BLD AUTO: 0.04 10*3/MM3 (ref 0–0.2)
BASOPHILS NFR BLD AUTO: 0.6 % (ref 0–1.5)
BILIRUB SERPL-MCNC: 0.9 MG/DL (ref 0–1.2)
BUN SERPL-MCNC: 6 MG/DL (ref 6–20)
BUN/CREAT SERPL: 9.4 (ref 7–25)
CALCIUM SPEC-SCNC: 9.5 MG/DL (ref 8.6–10.5)
CHLORIDE SERPL-SCNC: 102 MMOL/L (ref 98–107)
CHOLEST SERPL-MCNC: 197 MG/DL (ref 0–200)
CO2 SERPL-SCNC: 25 MMOL/L (ref 22–29)
CREAT SERPL-MCNC: 0.64 MG/DL (ref 0.57–1)
DEPRECATED RDW RBC AUTO: 39.3 FL (ref 37–54)
EGFRCR SERPLBLD CKD-EPI 2021: 116.9 ML/MIN/1.73
EOSINOPHIL # BLD AUTO: 0.07 10*3/MM3 (ref 0–0.4)
EOSINOPHIL NFR BLD AUTO: 1.1 % (ref 0.3–6.2)
ERYTHROCYTE [DISTWIDTH] IN BLOOD BY AUTOMATED COUNT: 12.6 % (ref 12.3–15.4)
GLOBULIN UR ELPH-MCNC: 3 GM/DL
GLUCOSE SERPL-MCNC: 84 MG/DL (ref 65–99)
HBA1C MFR BLD: 4.8 % (ref 4.8–5.6)
HCT VFR BLD AUTO: 39.5 % (ref 34–46.6)
HCV AB SER QL: NORMAL
HDLC SERPL-MCNC: 98 MG/DL (ref 40–60)
HGB BLD-MCNC: 12.6 G/DL (ref 12–15.9)
HOLD SPECIMEN: NORMAL
IMM GRANULOCYTES # BLD AUTO: 0.01 10*3/MM3 (ref 0–0.05)
IMM GRANULOCYTES NFR BLD AUTO: 0.2 % (ref 0–0.5)
LDLC SERPL CALC-MCNC: 91 MG/DL (ref 0–100)
LDLC/HDLC SERPL: 0.93 {RATIO}
LYMPHOCYTES # BLD AUTO: 1.81 10*3/MM3 (ref 0.7–3.1)
LYMPHOCYTES NFR BLD AUTO: 28.8 % (ref 19.6–45.3)
MCH RBC QN AUTO: 27.5 PG (ref 26.6–33)
MCHC RBC AUTO-ENTMCNC: 31.9 G/DL (ref 31.5–35.7)
MCV RBC AUTO: 86.2 FL (ref 79–97)
MONOCYTES # BLD AUTO: 0.41 10*3/MM3 (ref 0.1–0.9)
MONOCYTES NFR BLD AUTO: 6.5 % (ref 5–12)
NEUTROPHILS NFR BLD AUTO: 3.94 10*3/MM3 (ref 1.7–7)
NEUTROPHILS NFR BLD AUTO: 62.8 % (ref 42.7–76)
NRBC BLD AUTO-RTO: 0 /100 WBC (ref 0–0.2)
PLATELET # BLD AUTO: 420 10*3/MM3 (ref 140–450)
PMV BLD AUTO: 9.7 FL (ref 6–12)
POTASSIUM SERPL-SCNC: 5 MMOL/L (ref 3.5–5.2)
PROT SERPL-MCNC: 7.7 G/DL (ref 6–8.5)
RBC # BLD AUTO: 4.58 10*6/MM3 (ref 3.77–5.28)
SODIUM SERPL-SCNC: 138 MMOL/L (ref 136–145)
TRIGL SERPL-MCNC: 39 MG/DL (ref 0–150)
TSH SERPL DL<=0.05 MIU/L-ACNC: 1.16 UIU/ML (ref 0.27–4.2)
VIT B12 BLD-MCNC: 332 PG/ML (ref 211–946)
VLDLC SERPL-MCNC: 8 MG/DL (ref 5–40)
WBC NRBC COR # BLD AUTO: 6.28 10*3/MM3 (ref 3.4–10.8)

## 2025-03-18 PROCEDURE — 84443 ASSAY THYROID STIM HORMONE: CPT | Performed by: NURSE PRACTITIONER

## 2025-03-18 PROCEDURE — 85025 COMPLETE CBC W/AUTO DIFF WBC: CPT | Performed by: NURSE PRACTITIONER

## 2025-03-18 PROCEDURE — 81003 URINALYSIS AUTO W/O SCOPE: CPT | Performed by: NURSE PRACTITIONER

## 2025-03-18 PROCEDURE — 82607 VITAMIN B-12: CPT | Performed by: NURSE PRACTITIONER

## 2025-03-18 PROCEDURE — 80053 COMPREHEN METABOLIC PANEL: CPT | Performed by: NURSE PRACTITIONER

## 2025-03-18 PROCEDURE — 83036 HEMOGLOBIN GLYCOSYLATED A1C: CPT | Performed by: NURSE PRACTITIONER

## 2025-03-18 PROCEDURE — 80061 LIPID PANEL: CPT | Performed by: NURSE PRACTITIONER

## 2025-03-18 PROCEDURE — 86803 HEPATITIS C AB TEST: CPT | Performed by: NURSE PRACTITIONER

## 2025-03-18 RX ORDER — LISDEXAMFETAMINE DIMESYLATE 60 MG/1
60 TABLET, CHEWABLE ORAL EVERY MORNING
Qty: 30 TABLET | Refills: 0 | Status: SHIPPED | OUTPATIENT
Start: 2025-03-18

## 2025-03-18 NOTE — TELEPHONE ENCOUNTER
Rx Refill Note  Requested Prescriptions     Pending Prescriptions Disp Refills    Vyvanse 60 MG chewable tablet 30 tablet 0     Sig: Chew 1 tablet Every Morning      Last office visit with prescribing clinician: 1/14/2025   Last telemedicine visit with prescribing clinician: Visit date not found   Next office visit with prescribing clinician: 5/6/2025   Office Visit with Patt Perry MD (01/14/2025)     ToxAssure Flex 22, Ur w/DL - Urine, Random Void (05/14/2024 15:45)                   Would you like a call back once the refill request has been completed: [] Yes [] No    If the office needs to give you a call back, can they leave a voicemail: [] Yes [] No  Last sold 2/21/25  BEHAVIORAL HEALTH - SCAN - [facility] 3/18/25 (03/18/2025)   Rena Andrade  03/18/25, 15:08 EDT

## 2025-03-19 ENCOUNTER — RESULTS FOLLOW-UP (OUTPATIENT)
Dept: FAMILY MEDICINE CLINIC | Facility: CLINIC | Age: 40
End: 2025-03-19
Payer: COMMERCIAL

## 2025-03-19 LAB
BILIRUB UR QL STRIP: NEGATIVE
CLARITY UR: CLEAR
COLOR UR: YELLOW
GLUCOSE UR STRIP-MCNC: NEGATIVE MG/DL
HGB UR QL STRIP.AUTO: NEGATIVE
KETONES UR QL STRIP: NEGATIVE
LEUKOCYTE ESTERASE UR QL STRIP.AUTO: NEGATIVE
NITRITE UR QL STRIP: NEGATIVE
PH UR STRIP.AUTO: 6.5 [PH] (ref 5–8)
PROT UR QL STRIP: NEGATIVE
SP GR UR STRIP: <=1.005 (ref 1–1.03)
UROBILINOGEN UR QL STRIP: NORMAL

## 2025-04-17 DIAGNOSIS — F90.0 ADHD, PREDOMINANTLY INATTENTIVE TYPE: Chronic | ICD-10-CM

## 2025-04-17 NOTE — TELEPHONE ENCOUNTER
Rx Refill Note  Requested Prescriptions     Pending Prescriptions Disp Refills    Vyvanse 60 MG chewable tablet 30 tablet 0     Sig: Chew 1 tablet Every Morning      Last office visit with prescribing clinician: 1/14/2025   Last telemedicine visit with prescribing clinician: Visit date not found   Next office visit with prescribing clinician: 5/6/2025   Office Visit with Patt Perry MD (01/14/2025)   ToxAssure Flex 22, Ur w/DL - Urine, Random Void (05/14/2024 15:45)                     Would you like a call back once the refill request has been completed: [] Yes [] No    If the office needs to give you a call back, can they leave a voicemail: [] Yes [] No    Paris Manzo MA  04/17/25, 15:05 EDT    LAST FILL 3-24-25; UPLOADED; PT WANTS NAME BRAND ONLY CHEWABLE 60 MG VYVANSE TO GO TO [facility] [place]

## 2025-04-18 RX ORDER — LISDEXAMFETAMINE DIMESYLATE 60 MG/1
60 TABLET, CHEWABLE ORAL EVERY MORNING
Qty: 30 TABLET | Refills: 0 | Status: SHIPPED | OUTPATIENT
Start: 2025-04-18

## 2025-04-22 DIAGNOSIS — F90.0 ADHD, PREDOMINANTLY INATTENTIVE TYPE: Chronic | ICD-10-CM

## 2025-04-22 NOTE — TELEPHONE ENCOUNTER
Rx Refill Note  Requested Prescriptions     Pending Prescriptions Disp Refills    Vyvanse 60 MG chewable tablet 30 tablet 0     Sig: Chew 1 tablet Every Morning        Last office visit with prescribing clinician: 1/14/2025     Next office visit with prescribing clinician: 5/6/2025     Office Visit with Patt Perry MD (01/14/2025)     ToxAssure Flex 22, Ur w/DL - Urine, Random Void (05/14/2024 15:45)     BEHAVIORAL HEALTH - SCAN - Inspect report, Jeff, 4/22/25 (04/22/2025)     Inspect Fill 4/21/25 for #10    Mary Chauhan  04/22/25, 14:24 EDT

## 2025-04-23 RX ORDER — LISDEXAMFETAMINE DIMESYLATE 60 MG/1
60 TABLET, CHEWABLE ORAL EVERY MORNING
Qty: 30 TABLET | Refills: 0 | Status: SHIPPED | OUTPATIENT
Start: 2025-04-23

## 2025-04-28 DIAGNOSIS — F90.0 ADHD, PREDOMINANTLY INATTENTIVE TYPE: Chronic | ICD-10-CM

## 2025-04-29 RX ORDER — LISDEXAMFETAMINE DIMESYLATE 60 MG/1
60 TABLET, CHEWABLE ORAL EVERY MORNING
Qty: 30 TABLET | Refills: 0 | Status: SHIPPED | OUTPATIENT
Start: 2025-04-29

## 2025-04-29 NOTE — TELEPHONE ENCOUNTER
Rx Refill Note  Requested Prescriptions     Pending Prescriptions Disp Refills    Lisdexamfetamine Dimesylate (Vyvanse) 60 MG chewable tablet 30 tablet 0     Sig: Chew 1 tablet Every Morning        Last office visit with prescribing clinician: 1/14/2025     Next office visit with prescribing clinician: 5/6/2025     Office Visit with Patt Perry MD (01/14/2025)     ToxAssure Flex 22, Ur w/DL - Urine, Random Void (05/14/2024 15:45)     BEHAVIORAL HEALTH - SCAN - Inspect report, Jeff, 4/29/25 (04/29/2025)     Inspect Fill 4/21/25 #10    Mary Chauhan  04/29/25, 09:26 EDT

## 2025-05-06 ENCOUNTER — OFFICE VISIT (OUTPATIENT)
Dept: PSYCHIATRY | Facility: CLINIC | Age: 40
End: 2025-05-06
Payer: COMMERCIAL

## 2025-05-06 DIAGNOSIS — F90.0 ADHD, PREDOMINANTLY INATTENTIVE TYPE: Primary | Chronic | ICD-10-CM

## 2025-05-06 NOTE — PROGRESS NOTES
Subjective   Annalisa Fuentes is a 39 y.o. female who presents today for follow up        Chief Complaint:   To f/u on decreased concentration , to refill meds to maintain stability      History of Present Illness:     Long history of ADHD,  Was  on different meds in the past, was stable on current meds     Today the pt reported no major problems in general when on meds, just noticed it does not last long and feels scattered and easily distracted in the afternoon toward the end of the shift   Mood -stable   The pt remains stable on meds, stay busy at work, productive, stays on task,   Improved task completion  Anxiety is manageable, no panic attacks    Denied AVH/SI/HI  Her job requires concentration, taking meds for work only   No procrastination    No side effects , no chest discomfort    No irritability , no manic episodes     Precipitating and Ameliorating Factors: job related stress  alleviating factors - to spend time with baby animals (suirrels)   Dogs         PAST PSYCHIATRIC HISTORY      Previous Psychiatric Diagnoses   Axis I: Attention Deficit Disorder     Past Hospitalizations or Residential Treatment   Locations\Providers: none      Past Outpatient Treatment   Diagnosis Treated: ADD  Location: PCP      Prior Psychiatric Medications   adderall - skin picking   Ritalin  -not effective and sedation   Vyvanse - does not last long      Consequences of Mental Disorder   Consequences: emotional distress     SOCIAL HISTORY     Number of marriages: 1  Number of children: 0  Current Relationship is: supportive  Family of Origin is: abusive     Current Living Situation   Lives with: spouse     Education   Level: some college     Employment   Job Status: full-time     Hobbies and Leisure Activities   Activity Type: quiet activities     Alcohol use   Freq. drinkin drink  Smoking History   Smoking Hx: Never smoker     Exercise   Exercise sessions (per wk): 1     Illicit Drug Use   Illicit Drugs used: none      FAMILY HISTORY OF MENTAL DISORDERS   fh Grandparents: Non-contributory  fh Mother: Non-contributory  fh Father: Addictive Disorders - Alcoholism  fh Siblings: Non-contributory  fh Other: Non-contributory     The following portions of the patient's history were reviewed and updated as appropriate: allergies, current medications, past family history, past medical history, past social history, past surgical history and problem list.      Interval History  No Change - stable     Side Effects  Denied       Past Medical History:  Past Medical History:   Diagnosis Date    ADHD (attention deficit hyperactivity disorder)        Social History:  Social History     Socioeconomic History    Marital status:    Tobacco Use    Smoking status: Never     Passive exposure: Never    Smokeless tobacco: Never   Vaping Use    Vaping status: Never Used   Substance and Sexual Activity    Alcohol use: Yes     Comment: socially    Drug use: No    Sexual activity: Yes     Partners: Female       Family History:  Family History   Problem Relation Age of Onset    Alzheimer's disease Maternal Grandmother     Throat cancer Paternal Grandmother        Past Surgical History:  Past Surgical History:   Procedure Laterality Date    BREAST AUGMENTATION         Problem List:  Patient Active Problem List   Diagnosis    ADHD, predominantly inattentive type    Carpal tunnel syndrome    Vitamin D deficiency    ADD (attention deficit disorder)    Numbness and tingling in both hands    Dysphagia    Annual physical exam       Allergy:   Allergies   Allergen Reactions    Amoxicillin Nausea And Vomiting        Discontinued Medications:  There are no discontinued medications.        Current Medications:   Current Outpatient Medications   Medication Sig Dispense Refill    Vyvanse 60 MG chewable tablet Chew 1 tablet Every Morning 30 tablet 0     No current facility-administered medications for this visit.         Review of Symptoms:     Psychiatric/Behavioral: Negative for agitation, behavioral problems, confusion, decreased concentration, dysphoric mood, hallucinations, self-injury, sleep disturbance and suicidal ideas.   The patient is not nervous/anxious and is not hyperactive.        Physical Exam:   There were no vitals taken for this visit.    Mental Status Exam:   Hygiene:   good  Cooperation:  Cooperative  Eye Contact:  Good  Psychomotor Behavior:  Appropriate  Affect:  Appropriate  Mood: normal  Hopelessness: Denies  Speech:  Normal  Thought Process:  Goal directed and Linear  Thought Content:  Normal  Suicidal:  None  Homicidal:  None  Hallucinations:  None  Delusion:  None  Memory:  Intact  Orientation:  Person, Place, Time and Situation  Reliability:  good  Insight:  Good  Judgement:  Good  Impulse Control:  Good  Physical/Medical Issues:  No      MSE from 1/14/25       was reviewed and no changes necessary    PHQ-9 Depression Screening  Little interest or pleasure in doing things? Not at all   Feeling down, depressed, or hopeless? Not at all   PHQ-2 Total Score 0   Trouble falling or staying asleep, or sleeping too much? Several days   Feeling tired or having little energy? Not at all   Poor appetite or overeating? Not at all   Feeling bad about yourself - or that you are a failure or have let yourself or your family down? Not at all   Trouble concentrating on things, such as reading the newspaper or watching television? Not at all   Moving or speaking so slowly that other people could have noticed? Or the opposite - being so fidgety or restless that you have been moving around a lot more than usual? Not at all   Thoughts that you would be better off dead, or of hurting yourself in some way? Not at all   PHQ-9 Total Score 1   If you checked off any problems, how difficult have these problems made it for you to do your work, take care of things at home, or get along with other people? Not difficult at all    Over the last two weeks,  how often have you been bothered by the following problems?  Feeling nervous, anxious or on edge: Several days  Not being able to stop or control worrying: Not at all  Worrying too much about different things: Not at all  Trouble Relaxing: Not at all  Being so restless that it is hard to sit still: Not at all  Becoming easily annoyed or irritable: Not at all  Feeling afraid as if something awful might happen: Several days  MANPREET 7 Total Score: 2  If you checked any problems, how difficult have these problems made it for you to do your work, take care of things at home, or get along with other people: Somewhat difficult           Never smoker    I advised Annalisa of the risks of tobacco use.     Lab Results:   Lab on 03/18/2025   Component Date Value Ref Range Status    Glucose 03/18/2025 84  65 - 99 mg/dL Final    BUN 03/18/2025 6  6 - 20 mg/dL Final    Creatinine 03/18/2025 0.64  0.57 - 1.00 mg/dL Final    Sodium 03/18/2025 138  136 - 145 mmol/L Final    Potassium 03/18/2025 5.0  3.5 - 5.2 mmol/L Final    Chloride 03/18/2025 102  98 - 107 mmol/L Final    CO2 03/18/2025 25.0  22.0 - 29.0 mmol/L Final    Calcium 03/18/2025 9.5  8.6 - 10.5 mg/dL Final    Total Protein 03/18/2025 7.7  6.0 - 8.5 g/dL Final    Albumin 03/18/2025 4.7  3.5 - 5.2 g/dL Final    ALT (SGPT) 03/18/2025 14  1 - 33 U/L Final    AST (SGOT) 03/18/2025 20  1 - 32 U/L Final    Alkaline Phosphatase 03/18/2025 72  39 - 117 U/L Final    Total Bilirubin 03/18/2025 0.9  0.0 - 1.2 mg/dL Final    Globulin 03/18/2025 3.0  gm/dL Final    A/G Ratio 03/18/2025 1.6  g/dL Final    BUN/Creatinine Ratio 03/18/2025 9.4  7.0 - 25.0 Final    Anion Gap 03/18/2025 11.0  5.0 - 15.0 mmol/L Final    eGFR 03/18/2025 116.9  >60.0 mL/min/1.73 Final    Hemoglobin A1C 03/18/2025 4.80  4.80 - 5.60 % Final    Color, UA 03/18/2025 Yellow  Yellow, Straw Final    Appearance, UA 03/18/2025 Clear  Clear Final    pH, UA 03/18/2025 6.5  5.0 - 8.0 Final    Specific Gravity, UA 03/18/2025  <=1.005  1.005 - 1.030 Final    Glucose, UA 03/18/2025 Negative  Negative Final    Ketones, UA 03/18/2025 Negative  Negative Final    Bilirubin, UA 03/18/2025 Negative  Negative Final    Blood, UA 03/18/2025 Negative  Negative Final    Protein, UA 03/18/2025 Negative  Negative Final    Leuk Esterase, UA 03/18/2025 Negative  Negative Final    Nitrite, UA 03/18/2025 Negative  Negative Final    Urobilinogen, UA 03/18/2025 0.2 E.U./dL  0.2 - 1.0 E.U./dL Final    TSH 03/18/2025 1.160  0.270 - 4.200 uIU/mL Final    Total Cholesterol 03/18/2025 197  0 - 200 mg/dL Final    Triglycerides 03/18/2025 39  0 - 150 mg/dL Final    HDL Cholesterol 03/18/2025 98 (H)  40 - 60 mg/dL Final    LDL Cholesterol  03/18/2025 91  0 - 100 mg/dL Final    VLDL Cholesterol 03/18/2025 8  5 - 40 mg/dL Final    LDL/HDL Ratio 03/18/2025 0.93   Final    Vitamin B-12 03/18/2025 332  211 - 946 pg/mL Final    Hepatitis C Ab 03/18/2025 Non-Reactive  Non-Reactive Final    WBC 03/18/2025 6.28  3.40 - 10.80 10*3/mm3 Final    RBC 03/18/2025 4.58  3.77 - 5.28 10*6/mm3 Final    Hemoglobin 03/18/2025 12.6  12.0 - 15.9 g/dL Final    Hematocrit 03/18/2025 39.5  34.0 - 46.6 % Final    MCV 03/18/2025 86.2  79.0 - 97.0 fL Final    MCH 03/18/2025 27.5  26.6 - 33.0 pg Final    MCHC 03/18/2025 31.9  31.5 - 35.7 g/dL Final    RDW 03/18/2025 12.6  12.3 - 15.4 % Final    RDW-SD 03/18/2025 39.3  37.0 - 54.0 fl Final    MPV 03/18/2025 9.7  6.0 - 12.0 fL Final    Platelets 03/18/2025 420  140 - 450 10*3/mm3 Final    Neutrophil % 03/18/2025 62.8  42.7 - 76.0 % Final    Lymphocyte % 03/18/2025 28.8  19.6 - 45.3 % Final    Monocyte % 03/18/2025 6.5  5.0 - 12.0 % Final    Eosinophil % 03/18/2025 1.1  0.3 - 6.2 % Final    Basophil % 03/18/2025 0.6  0.0 - 1.5 % Final    Immature Grans % 03/18/2025 0.2  0.0 - 0.5 % Final    Neutrophils, Absolute 03/18/2025 3.94  1.70 - 7.00 10*3/mm3 Final    Lymphocytes, Absolute 03/18/2025 1.81  0.70 - 3.10 10*3/mm3 Final    Monocytes, Absolute  03/18/2025 0.41  0.10 - 0.90 10*3/mm3 Final    Eosinophils, Absolute 03/18/2025 0.07  0.00 - 0.40 10*3/mm3 Final    Basophils, Absolute 03/18/2025 0.04  0.00 - 0.20 10*3/mm3 Final    Immature Grans, Absolute 03/18/2025 0.01  0.00 - 0.05 10*3/mm3 Final    nRBC 03/18/2025 0.0  0.0 - 0.2 /100 WBC Final    Extra Tube 03/18/2025 Hold for add-ons.   Final    Auto resulted.       Assessment & Plan   Problems Addressed this Visit       ADHD, predominantly inattentive type - Primary (Chronic)     Diagnoses         Codes Comments      ADHD, predominantly inattentive type    -  Primary ICD-10-CM: F90.0  ICD-9-CM: 314.00             Visit Diagnoses:    ICD-10-CM ICD-9-CM   1. ADHD, predominantly inattentive type  F90.0 314.00         TREATMENT PLAN/GOALS: Continue supportive psychotherapy efforts and medications as indicated. Treatment and medication options discussed during today's visit. Patient ackowledged and verbally consented to continue with current treatment plan and was educated on the importance of compliance with treatment and follow-up appointments.    MEDICATION ISSUES:  1. ADHD inattentive type -  Cont vyvanse chewable  60 mg , no changes necessary    long term stimulant use discussed , the pt is using for work only , her job requires concentration, the pt works long hours and vyvanse does not last long, consider Dyanavel XR   No signs of abuse/misuse, UDS consistent      2.  long term therapeutic drug monitoring - 12/2/2022 consistent, 8/12/2022 consistent , 5/17/23 - consistent    LABORATORY - SCAN - ABBREVIATED URINE DRUG SCREEN. [facility], 05/17/2023 (05/17/2023) ,   UDA 5/15/24 - consistent   ToxAssure Flex 22, Ur w/DL - Urine, Random Void (05/14/2024 15:45)      INSPECT reviewed as expected - 5/1/25   - vyvanse last refill            PHQ scored 7 and indicated mild  Depression (mainly bereavement)     MANPREET 7 scored 2   Patient screened positive for depression based on a PHQ-9 score of 1 on 5/6/2025. Follow-up  recommendations include:  no indications for antidepressants  .    Discussed medication options and treatment plan of prescribed medication as well as the risks, benefits, and side effects including potential falls, possible impaired driving and metabolic adversities among others. Patient is agreeable to call the office with any worsening of symptoms or onset of side effects. Patient is agreeable to call 911 or go to the nearest ER should he/she begin having SI/HI. No medication side effects or related complaints today.     MEDS ORDERED DURING VISIT:  No orders of the defined types were placed in this encounter.   Just filled on 5/1/25     Return in about 4 months (around 9/6/2025).          This document has been electronically signed by Patt Perry MD  May 6, 2025 14:56 EDT

## 2025-05-27 DIAGNOSIS — F90.0 ADHD, PREDOMINANTLY INATTENTIVE TYPE: Chronic | ICD-10-CM

## 2025-05-28 RX ORDER — LISDEXAMFETAMINE DIMESYLATE 60 MG/1
60 TABLET, CHEWABLE ORAL EVERY MORNING
Qty: 30 TABLET | Refills: 0 | Status: SHIPPED | OUTPATIENT
Start: 2025-05-28

## 2025-05-28 NOTE — TELEPHONE ENCOUNTER
Rx Refill Note  Requested Prescriptions     Pending Prescriptions Disp Refills    Vyvanse 60 MG chewable tablet 30 tablet 0     Sig: Chew 1 tablet Every Morning      Last office visit with prescribing clinician: 5/6/2025     Next office visit with prescribing clinician: 9/30/2025     Office Visit with Patt Perry MD (05/06/2025)     Amphetamines, MS, Ur RFX - (05/14/2024 15:45)  ToxAssure Flex 22, Ur w/DL - Urine, Random Void (05/14/2024 15:45)    BEHAVIORAL HEALTH - SCAN - Inspect, fouzia, 1987, raymond (05/28/2025)     Med check - 9-    Last Inspect fill: 5-    Joana Ledezma MA  05/28/25, 09:06 EDT

## 2025-06-25 DIAGNOSIS — F90.0 ADHD, PREDOMINANTLY INATTENTIVE TYPE: Chronic | ICD-10-CM

## 2025-06-26 RX ORDER — LISDEXAMFETAMINE DIMESYLATE 60 MG/1
60 TABLET, CHEWABLE ORAL EVERY MORNING
Qty: 30 TABLET | Refills: 0 | Status: SHIPPED | OUTPATIENT
Start: 2025-06-26

## 2025-06-26 NOTE — TELEPHONE ENCOUNTER
Rx Refill Note  Requested Prescriptions     Pending Prescriptions Disp Refills    Vyvanse 60 MG chewable tablet 30 tablet 0     Sig: Chew 1 tablet Every Morning        Last office visit with prescribing clinician: 5/6/2025     Next office visit with prescribing clinician: 9/30/2025     Office Visit with Patt Ochoa MD (05/06/2025)   ToxAssure Flex 22, Ur w/DL - Urine, Random Void (05/14/2024 15:45)   BEHAVIORAL HEALTH - SCAN - INSPECT REPORT - LILI OCHOA - 06- (06/26/2025)         Inspect Fill 5/31/2025 QTY 30 DAYS 30    Krystyna Barrera MA  06/26/25, 08:21 EDT

## 2025-07-24 DIAGNOSIS — F90.0 ADHD, PREDOMINANTLY INATTENTIVE TYPE: Chronic | ICD-10-CM

## 2025-07-25 RX ORDER — LISDEXAMFETAMINE DIMESYLATE 60 MG/1
60 TABLET, CHEWABLE ORAL EVERY MORNING
Qty: 30 TABLET | Refills: 0 | Status: SHIPPED | OUTPATIENT
Start: 2025-07-25

## 2025-07-25 NOTE — TELEPHONE ENCOUNTER
Rx Refill Note  Requested Prescriptions     Pending Prescriptions Disp Refills    Vyvanse 60 MG chewable tablet 30 tablet 0     Sig: Chew 1 tablet Every Morning        Last office visit with prescribing clinician: 5/6/2025     Next office visit with prescribing clinician: 9/30/2025     Office Visit with Patt Ochoa MD (05/06/2025)     NO UDS    BEHAVIORAL HEALTH - SCAN - INSPECT REPORT - YJase OCHOA - 07/25/2025 (07/25/2025)     Inspect Fill Vyvanse 0 mg Chewable Tablet, sold on 06/30/2025, Qty of 30 for 30 Days    Krystyna Barrera MA  07/25/25, 08:16 EDT

## 2025-08-22 DIAGNOSIS — F90.0 ADHD, PREDOMINANTLY INATTENTIVE TYPE: Chronic | ICD-10-CM

## 2025-08-25 RX ORDER — LISDEXAMFETAMINE DIMESYLATE 60 MG/1
60 TABLET, CHEWABLE ORAL EVERY MORNING
Qty: 30 TABLET | Refills: 0 | Status: SHIPPED | OUTPATIENT
Start: 2025-08-25